# Patient Record
Sex: MALE | Race: WHITE | Employment: FULL TIME | URBAN - METROPOLITAN AREA
[De-identification: names, ages, dates, MRNs, and addresses within clinical notes are randomized per-mention and may not be internally consistent; named-entity substitution may affect disease eponyms.]

---

## 2020-05-08 ENCOUNTER — MEDICAL CORRESPONDENCE (OUTPATIENT)
Dept: HEALTH INFORMATION MANAGEMENT | Facility: CLINIC | Age: 25
End: 2020-05-08

## 2020-05-08 ENCOUNTER — TRANSFERRED RECORDS (OUTPATIENT)
Dept: HEALTH INFORMATION MANAGEMENT | Facility: CLINIC | Age: 25
End: 2020-05-08

## 2020-09-03 ENCOUNTER — HOSPITAL ENCOUNTER (OUTPATIENT)
Dept: MRI IMAGING | Facility: CLINIC | Age: 25
Discharge: HOME | End: 2020-09-03
Admitting: INTERNAL MEDICINE
Payer: COMMERCIAL

## 2020-09-03 DIAGNOSIS — I48.91 AFIB (H): ICD-10-CM

## 2020-09-03 PROCEDURE — 25500064 ZZH RX 255 OP 636

## 2020-09-03 PROCEDURE — A9585 GADOBUTROL INJECTION: HCPCS

## 2020-09-03 PROCEDURE — 75561 CARDIAC MRI FOR MORPH W/DYE: CPT

## 2020-09-03 PROCEDURE — 75561 CARDIAC MRI FOR MORPH W/DYE: CPT | Mod: 26 | Performed by: INTERNAL MEDICINE

## 2020-09-03 RX ORDER — GADOBUTROL 604.72 MG/ML
7.5 INJECTION INTRAVENOUS ONCE
Status: COMPLETED | OUTPATIENT
Start: 2020-09-03 | End: 2020-09-03

## 2020-09-03 RX ADMIN — GADOBUTROL 7.5 ML: 604.72 INJECTION INTRAVENOUS at 15:21

## 2024-10-23 ENCOUNTER — OFFICE VISIT (OUTPATIENT)
Dept: URGENT CARE | Age: 29
End: 2024-10-23
Payer: COMMERCIAL

## 2024-10-23 DIAGNOSIS — S93.402A SPRAIN OF LEFT ANKLE, UNSPECIFIED LIGAMENT, INITIAL ENCOUNTER: ICD-10-CM

## 2024-10-23 DIAGNOSIS — M25.572 ACUTE LEFT ANKLE PAIN: Primary | ICD-10-CM

## 2024-10-23 RX ORDER — METOPROLOL SUCCINATE 100 MG/1
TABLET, EXTENDED RELEASE ORAL
COMMUNITY
Start: 2024-08-19

## 2024-10-23 ASSESSMENT — ENCOUNTER SYMPTOMS: ARTHRALGIAS: 1

## 2024-10-24 NOTE — PROGRESS NOTES
Subjective   Patient ID: Brandon Schmidt is a 29 y.o. male. They present today with a chief complaint of No chief complaint on file..    History of Present Illness  Patient is a 29-year-old male with no reported past medical history presents urgent care this evening with a complaint of left ankle pain.  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  He denies any other injuries or complaints.  He is not on blood thinners.      History provided by:  Patient      Past Medical History  Allergies as of 10/23/2024    (Not on File)       (Not in a hospital admission)         No past medical history on file.    No past surgical history on file.         Review of Systems  Review of Systems   Musculoskeletal:  Positive for arthralgias.                                  Objective    There were no vitals filed for this visit.  No LMP for male patient.    Physical Exam  Vitals and nursing note reviewed.   Constitutional:       General: He is not in acute distress.     Appearance: Normal appearance. He is not ill-appearing, toxic-appearing or diaphoretic.   HENT:      Head: Normocephalic and atraumatic.      Mouth/Throat:      Mouth: Mucous membranes are moist.   Eyes:      Extraocular Movements: Extraocular movements intact.      Conjunctiva/sclera: Conjunctivae normal.      Pupils: Pupils are equal, round, and reactive to light.   Cardiovascular:      Rate and Rhythm: Normal rate and regular rhythm.      Pulses: Normal pulses.      Heart sounds: Normal heart sounds.   Pulmonary:      Effort: Pulmonary effort is normal. No respiratory distress.      Breath sounds: Normal breath sounds. No stridor. No wheezing, rhonchi or rales.   Chest:      Chest wall: No tenderness.   Musculoskeletal:         General: Tenderness and signs of injury present. No swelling or deformity. Normal range of motion.      Cervical back: Normal range of motion and neck supple.      Right lower leg: No  edema.      Left lower leg: No edema.      Left foot: Normal range of motion. No deformity.        Feet:    Feet:      Left foot:      Skin integrity: Skin integrity normal. No erythema or warmth.      Comments: Mild pain with palpation inferior to the lateral malleolus as depicted above.  No appreciable edema.  No ecchymosis.  No obvious deformity or dislocation.  Normal skin color and temperature.  Pedal pulse strong and regular.  Capillary refill normal.  Skin:     General: Skin is warm and dry.      Capillary Refill: Capillary refill takes less than 2 seconds.   Neurological:      General: No focal deficit present.      Mental Status: He is alert and oriented to person, place, and time.   Psychiatric:         Mood and Affect: Mood normal.         Behavior: Behavior normal.         Procedures      Assessment/Plan   Allergies, medications, history, and pertinent labs/EKGs/Imaging reviewed by KELLY Lott.     Medical Decision Making  Patient is well appearing, afebrile, non toxic, not hypoxic, and appropriate for outpatient treatment and management at time of evaluation. Patient presents with left ankle pain as described above.     Differential includes but not limited to: Sprain, strain, fracture, other.  Low suspicion for fracture given exam findings and the fact the patient is able to ambulate without significant discomfort.  Suspect strain/strain.  Patient is unable to return for x-ray tomorrow and states that he does not feel as though it is necessary.  He was provided with an Aircast for comfort and stability.  Also recommended rest, ice, compression and elevation as well as over-the-counter pain medication as needed for symptom relief.  He will follow-up with his PCP as needed.  He was discharged in stable condition.  All questions and concerns addressed.      Plan: Discussed differential with the patient. Patient voices understanding and is agreeable to close follow-up with their PCP in the  next 2-3 days. They understand they should go to the emergency room immediately for any new, worsening or concerning symptoms. Patient understands return precautions and discharge instructions.      Orders and Diagnoses  Diagnoses and all orders for this visit:  Acute left ankle pain      Medical Admin Record      Follow Up Instructions  No follow-ups on file.    Patient disposition: Home    Electronically signed by [facility]  8:21 PM

## 2024-11-09 ENCOUNTER — HOSPITAL ENCOUNTER (EMERGENCY)
Age: 29
Discharge: HOME | End: 2024-11-09
Attending: EMERGENCY MEDICINE

## 2024-11-09 ENCOUNTER — APPOINTMENT (OUTPATIENT)
Dept: GENERAL RADIOLOGY | Age: 29
End: 2024-11-09

## 2024-11-09 VITALS
OXYGEN SATURATION: 96 % | DIASTOLIC BLOOD PRESSURE: 91 MMHG | HEART RATE: 94 BPM | RESPIRATION RATE: 23 BRPM | WEIGHT: 315 LBS | BODY MASS INDEX: 40.43 KG/M2 | TEMPERATURE: 97.8 F | HEIGHT: 74 IN | SYSTOLIC BLOOD PRESSURE: 143 MMHG

## 2024-11-09 DIAGNOSIS — I48.91 ATRIAL FIBRILLATION WITH RAPID VENTRICULAR RESPONSE (HCC): Primary | ICD-10-CM

## 2024-11-09 DIAGNOSIS — Z01.89 ENCOUNTER FOR CARDIOVERSION PROCEDURE: ICD-10-CM

## 2024-11-09 LAB
ALBUMIN SERPL-MCNC: 3.8 G/DL (ref 3.5–4.6)
ALP SERPL-CCNC: 102 U/L (ref 35–104)
ALT SERPL-CCNC: 55 U/L (ref 0–41)
ANION GAP SERPL CALCULATED.3IONS-SCNC: 12 MEQ/L (ref 9–15)
AST SERPL-CCNC: 31 U/L (ref 0–40)
BASOPHILS # BLD: 0.1 K/UL (ref 0–0.2)
BASOPHILS NFR BLD: 0.5 %
BILIRUB SERPL-MCNC: 0.3 MG/DL (ref 0.2–0.7)
BUN SERPL-MCNC: 13 MG/DL (ref 6–20)
CALCIUM SERPL-MCNC: 8.8 MG/DL (ref 8.5–9.9)
CHLORIDE SERPL-SCNC: 101 MEQ/L (ref 95–107)
CO2 SERPL-SCNC: 26 MEQ/L (ref 20–31)
CREAT SERPL-MCNC: 0.73 MG/DL (ref 0.7–1.2)
EKG ATRIAL RATE: 147 BPM
EKG Q-T INTERVAL: 266 MS
EKG QRS DURATION: 88 MS
EKG QTC CALCULATION (BAZETT): 427 MS
EKG R AXIS: 37 DEGREES
EKG T AXIS: 6 DEGREES
EKG VENTRICULAR RATE: 155 BPM
EOSINOPHIL # BLD: 0.2 K/UL (ref 0–0.7)
EOSINOPHIL NFR BLD: 1.7 %
ERYTHROCYTE [DISTWIDTH] IN BLOOD BY AUTOMATED COUNT: 13.3 % (ref 11.5–14.5)
GLOBULIN SER CALC-MCNC: 3.4 G/DL (ref 2.3–3.5)
GLUCOSE SERPL-MCNC: 115 MG/DL (ref 70–99)
HCT VFR BLD AUTO: 48.4 % (ref 42–52)
HGB BLD-MCNC: 15.8 G/DL (ref 14–18)
LYMPHOCYTES # BLD: 3.5 K/UL (ref 1–4.8)
LYMPHOCYTES NFR BLD: 26.8 %
MCH RBC QN AUTO: 28.7 PG (ref 27–31.3)
MCHC RBC AUTO-ENTMCNC: 32.6 % (ref 33–37)
MCV RBC AUTO: 87.8 FL (ref 79–92.2)
MONOCYTES # BLD: 1 K/UL (ref 0.2–0.8)
MONOCYTES NFR BLD: 7.5 %
NEUTROPHILS # BLD: 8.1 K/UL (ref 1.4–6.5)
NEUTS SEG NFR BLD: 63 %
PLATELET # BLD AUTO: 264 K/UL (ref 130–400)
POTASSIUM SERPL-SCNC: 4.1 MEQ/L (ref 3.4–4.9)
PROT SERPL-MCNC: 7.2 G/DL (ref 6.3–8)
RBC # BLD AUTO: 5.51 M/UL (ref 4.7–6.1)
SODIUM SERPL-SCNC: 139 MEQ/L (ref 135–144)
TROPONIN, HIGH SENSITIVITY: 10 NG/L (ref 0–19)
WBC # BLD AUTO: 12.9 K/UL (ref 4.8–10.8)

## 2024-11-09 PROCEDURE — 71045 X-RAY EXAM CHEST 1 VIEW: CPT

## 2024-11-09 PROCEDURE — 80053 COMPREHEN METABOLIC PANEL: CPT

## 2024-11-09 PROCEDURE — 85025 COMPLETE CBC W/AUTO DIFF WBC: CPT

## 2024-11-09 PROCEDURE — 96375 TX/PRO/DX INJ NEW DRUG ADDON: CPT

## 2024-11-09 PROCEDURE — 2500000003 HC RX 250 WO HCPCS

## 2024-11-09 PROCEDURE — 6360000002 HC RX W HCPCS: Performed by: EMERGENCY MEDICINE

## 2024-11-09 PROCEDURE — 96365 THER/PROPH/DIAG IV INF INIT: CPT

## 2024-11-09 PROCEDURE — 2500000003 HC RX 250 WO HCPCS: Performed by: EMERGENCY MEDICINE

## 2024-11-09 PROCEDURE — 6370000000 HC RX 637 (ALT 250 FOR IP): Performed by: EMERGENCY MEDICINE

## 2024-11-09 PROCEDURE — 93005 ELECTROCARDIOGRAM TRACING: CPT | Performed by: EMERGENCY MEDICINE

## 2024-11-09 PROCEDURE — 92960 CARDIOVERSION ELECTRIC EXT: CPT

## 2024-11-09 PROCEDURE — 84484 ASSAY OF TROPONIN QUANT: CPT

## 2024-11-09 PROCEDURE — 99285 EMERGENCY DEPT VISIT HI MDM: CPT

## 2024-11-09 RX ORDER — METOPROLOL TARTRATE 1 MG/ML
5 INJECTION, SOLUTION INTRAVENOUS ONCE
Status: COMPLETED | OUTPATIENT
Start: 2024-11-09 | End: 2024-11-09

## 2024-11-09 RX ORDER — ETOMIDATE 2 MG/ML
20 INJECTION INTRAVENOUS ONCE
Status: COMPLETED | OUTPATIENT
Start: 2024-11-09 | End: 2024-11-09

## 2024-11-09 RX ORDER — MAGNESIUM SULFATE IN WATER 40 MG/ML
2000 INJECTION, SOLUTION INTRAVENOUS ONCE
Status: COMPLETED | OUTPATIENT
Start: 2024-11-09 | End: 2024-11-09

## 2024-11-09 RX ORDER — METOPROLOL SUCCINATE 25 MG/1
25 TABLET, EXTENDED RELEASE ORAL ONCE
Status: COMPLETED | OUTPATIENT
Start: 2024-11-09 | End: 2024-11-09

## 2024-11-09 RX ORDER — DILTIAZEM HYDROCHLORIDE 5 MG/ML
20 INJECTION INTRAVENOUS ONCE
Status: COMPLETED | OUTPATIENT
Start: 2024-11-09 | End: 2024-11-09

## 2024-11-09 RX ORDER — METOPROLOL TARTRATE 1 MG/ML
INJECTION, SOLUTION INTRAVENOUS
Status: COMPLETED
Start: 2024-11-09 | End: 2024-11-09

## 2024-11-09 RX ORDER — METOPROLOL SUCCINATE 25 MG/1
25 TABLET, EXTENDED RELEASE ORAL DAILY
Qty: 30 TABLET | Refills: 0 | Status: SHIPPED | OUTPATIENT
Start: 2024-11-09

## 2024-11-09 RX ORDER — METOPROLOL SUCCINATE 25 MG/1
25 TABLET, EXTENDED RELEASE ORAL DAILY
Status: DISCONTINUED | OUTPATIENT
Start: 2024-11-09 | End: 2024-11-09

## 2024-11-09 RX ADMIN — METOPROLOL TARTRATE 5 MG: 5 INJECTION INTRAVENOUS at 05:00

## 2024-11-09 RX ADMIN — ETOMIDATE 20 MG: 2 INJECTION, SOLUTION INTRAVENOUS at 04:38

## 2024-11-09 RX ADMIN — METOPROLOL TARTRATE 5 MG: 1 INJECTION, SOLUTION INTRAVENOUS at 05:00

## 2024-11-09 RX ADMIN — MAGNESIUM SULFATE HEPTAHYDRATE 2000 MG: 40 INJECTION, SOLUTION INTRAVENOUS at 04:12

## 2024-11-09 RX ADMIN — METOPROLOL SUCCINATE 25 MG: 25 TABLET, EXTENDED RELEASE ORAL at 06:04

## 2024-11-09 RX ADMIN — RIVAROXABAN 20 MG: 20 TABLET, FILM COATED ORAL at 06:04

## 2024-11-09 RX ADMIN — DILTIAZEM HYDROCHLORIDE 20 MG: 5 INJECTION, SOLUTION INTRAVENOUS at 04:07

## 2024-11-09 ASSESSMENT — LIFESTYLE VARIABLES
HOW MANY STANDARD DRINKS CONTAINING ALCOHOL DO YOU HAVE ON A TYPICAL DAY: PATIENT DOES NOT DRINK
HOW OFTEN DO YOU HAVE A DRINK CONTAINING ALCOHOL: NEVER

## 2024-11-09 ASSESSMENT — PAIN SCALES - GENERAL: PAINLEVEL_OUTOF10: 0

## 2024-11-09 ASSESSMENT — PAIN - FUNCTIONAL ASSESSMENT: PAIN_FUNCTIONAL_ASSESSMENT: NONE - DENIES PAIN

## 2024-11-09 NOTE — ED PROVIDER NOTES
[facility] ED  eMERGENCY dEPARTMENT eNCOUnter      Pt Name: Napoleon Owens  MRN: [id number]  Birthdate 1995  Date of evaluation: 11/9/2024  Provider: Jemal Man MD    CHIEF COMPLAINT       Chief Complaint   Patient presents with    Tachycardia         HISTORY OF PRESENT ILLNESS   (Location/Symptom, Timing/Onset,Context/Setting, Quality, Duration, Modifying Factors, Severity)  Note limiting factors.   Napoleon Owens is a 29 y.o. male who presents to the emergency department for evaluation of rapid heartbeat.  Patient has history of atrial fibrillation controlled typically on metoprolol 100 mg daily.  He moved here few months ago from [place].  Last episode of A-fib was in [place] in April 2024.  He required cardioversion.  He is not on blood thinners.  No other medications.  He went to bed feeling fine and then woke up middle the night with these symptoms feeling like his heart was beating irregular and fast.  No chest pain.  No nausea vomiting.  No fever or chills.  He denies recent alcohol use abuse or extra caffeine in the past 24 hours    HPI    NursingNotes were reviewed.    REVIEW OF SYSTEMS    (2-9 systems for level 4, 10 or more for level 5)     Review of Systems   Constitutional:  Negative for chills and diaphoresis.   HENT:  Negative for congestion, ear pain, mouth sores and sore throat.    Eyes:  Negative for photophobia and discharge.   Respiratory:  Negative for cough, chest tightness, shortness of breath and wheezing.    Cardiovascular:  Negative for chest pain and palpitations.   Gastrointestinal:  Negative for abdominal distention, abdominal pain and vomiting.   Endocrine: Negative for cold intolerance.   Genitourinary:  Negative for difficulty urinating.   Musculoskeletal:  Negative for arthralgias.   Skin:  Negative for pallor and rash.   Allergic/Immunologic: Negative for immunocompromised state.   Neurological:  Negative for dizziness and syncope.   Hematological:

## 2024-11-09 NOTE — DISCHARGE INSTRUCTIONS
You are going to take a total of 125 mg of metoprolol daily.  Also going to restart Xarelto.  Follow-up with Dr. Roth.  Return for any recurring symptoms

## 2024-11-09 NOTE — ED NOTES
0448- 8 mL  (16 mg) of etomidate administered per Dr. Man.  0449- 200 J shock administered  0450-300 J shock administered   Additional 2 mL (4 mg) of etomidate administered per Dr. Man.  0451- 300 J shock administered. Pt cardio converted    0500 lopressor administered.

## 2024-11-09 NOTE — ED TRIAGE NOTES
Patient arrived to ER with girlfriend.  Patient was sleeping, woke up and felt heart racing.   Patient has h/o A-fib.   Respirations equal and unlabored, skin pink warm and dry.

## 2024-11-11 LAB
EKG ATRIAL RATE: 147 BPM
EKG Q-T INTERVAL: 266 MS
EKG QRS DURATION: 88 MS
EKG QTC CALCULATION (BAZETT): 427 MS
EKG R AXIS: 37 DEGREES
EKG T AXIS: 6 DEGREES
EKG VENTRICULAR RATE: 155 BPM

## 2024-11-12 LAB
EKG ATRIAL RATE: 91 BPM
EKG P AXIS: 42 DEGREES
EKG P-R INTERVAL: 176 MS
EKG Q-T INTERVAL: 366 MS
EKG QRS DURATION: 96 MS
EKG QTC CALCULATION (BAZETT): 450 MS
EKG R AXIS: 19 DEGREES
EKG T AXIS: 10 DEGREES
EKG VENTRICULAR RATE: 91 BPM

## 2025-01-22 ENCOUNTER — OFFICE VISIT (OUTPATIENT)
Dept: FAMILY MEDICINE CLINIC | Age: 30
End: 2025-01-22

## 2025-01-22 VITALS
WEIGHT: 315 LBS | HEART RATE: 101 BPM | OXYGEN SATURATION: 95 % | SYSTOLIC BLOOD PRESSURE: 128 MMHG | TEMPERATURE: 97.6 F | DIASTOLIC BLOOD PRESSURE: 82 MMHG | BODY MASS INDEX: 40.43 KG/M2 | HEIGHT: 74 IN

## 2025-01-22 DIAGNOSIS — R05.1 ACUTE COUGH: ICD-10-CM

## 2025-01-22 DIAGNOSIS — J02.9 SORE THROAT: Primary | ICD-10-CM

## 2025-01-22 LAB — RSV RAPID ANTIGEN: NORMAL

## 2025-01-22 SDOH — ECONOMIC STABILITY: FOOD INSECURITY: WITHIN THE PAST 12 MONTHS, THE FOOD YOU BOUGHT JUST DIDN'T LAST AND YOU DIDN'T HAVE MONEY TO GET MORE.: NEVER TRUE

## 2025-01-22 SDOH — ECONOMIC STABILITY: FOOD INSECURITY: WITHIN THE PAST 12 MONTHS, YOU WORRIED THAT YOUR FOOD WOULD RUN OUT BEFORE YOU GOT MONEY TO BUY MORE.: NEVER TRUE

## 2025-01-22 ASSESSMENT — ENCOUNTER SYMPTOMS
EYE REDNESS: 0
SHORTNESS OF BREATH: 0
COUGH: 1
COLOR CHANGE: 0
EYE DISCHARGE: 0
SINUS PAIN: 0
WHEEZING: 0
CHEST TIGHTNESS: 0
SORE THROAT: 1

## 2025-01-22 ASSESSMENT — PATIENT HEALTH QUESTIONNAIRE - PHQ9
2. FEELING DOWN, DEPRESSED OR HOPELESS: NOT AT ALL
SUM OF ALL RESPONSES TO PHQ QUESTIONS 1-9: 0
1. LITTLE INTEREST OR PLEASURE IN DOING THINGS: NOT AT ALL
SUM OF ALL RESPONSES TO PHQ9 QUESTIONS 1 & 2: 0
SUM OF ALL RESPONSES TO PHQ QUESTIONS 1-9: 0

## 2025-01-22 NOTE — PROGRESS NOTES
Napoleon Owens (: 1995) is a 29 y.o. male, New patient, who presents today for:    Chief Complaint   Patient presents with    Cough     Pt was around a family member this weekend that had RSV, pt has been under the weather since and wanted to get tested to find out if he has it also          Subjective     HPI:  HPI   Patient presents to the office to be tested for RSV  3 year old nephew with RSV and pt was around him over this past weekend    Pt started with s/s 4 days ago    Early this morning with 100.3 temp, Tylenol brought it down to normal  Pt with cough, scratchy throat  Denies Headache, earaches, wheezing or SOB    Covid negative at home yesterday and today    Review of Systems   Constitutional:  Positive for fever (100.3 earlier this morning, brought down with Tylenol). Negative for chills and unexpected weight change.   HENT:  Positive for postnasal drip and sore throat. Negative for congestion, ear pain and sinus pain.    Eyes:  Negative for discharge, redness and visual disturbance.   Respiratory:  Positive for cough. Negative for chest tightness, shortness of breath and wheezing.    Cardiovascular:  Negative for chest pain, palpitations and leg swelling.   Musculoskeletal:  Negative for arthralgias and myalgias.   Skin:  Negative for color change and rash.   Allergic/Immunologic: Negative for environmental allergies.   Neurological:  Negative for dizziness, light-headedness and headaches.   Hematological:  Negative for adenopathy.   Psychiatric/Behavioral:  Negative for confusion and sleep disturbance. The patient is not nervous/anxious.         Past Medical History:   Diagnosis Date    Atrial fibrillation, controlled ([facility])       Social History     Socioeconomic History    Marital status: Single     Spouse name: Not on file    Number of children: Not on file    Years of education: Not on file    Highest education level: Not on file   Occupational History    Not on file   Tobacco Use

## 2025-04-04 ENCOUNTER — PHARMACY VISIT (OUTPATIENT)
Dept: PHARMACY | Facility: CLINIC | Age: 30
End: 2025-04-04
Payer: COMMERCIAL

## 2025-04-04 ENCOUNTER — HOSPITAL ENCOUNTER (OUTPATIENT)
Facility: HOSPITAL | Age: 30
Setting detail: OBSERVATION
Discharge: HOME | End: 2025-04-04
Attending: STUDENT IN AN ORGANIZED HEALTH CARE EDUCATION/TRAINING PROGRAM | Admitting: INTERNAL MEDICINE
Payer: COMMERCIAL

## 2025-04-04 ENCOUNTER — APPOINTMENT (OUTPATIENT)
Dept: CARDIOLOGY | Facility: HOSPITAL | Age: 30
End: 2025-04-04
Payer: COMMERCIAL

## 2025-04-04 ENCOUNTER — APPOINTMENT (OUTPATIENT)
Dept: RADIOLOGY | Facility: HOSPITAL | Age: 30
End: 2025-04-04
Payer: COMMERCIAL

## 2025-04-04 VITALS
HEART RATE: 88 BPM | WEIGHT: 315 LBS | RESPIRATION RATE: 18 BRPM | SYSTOLIC BLOOD PRESSURE: 135 MMHG | HEIGHT: 74 IN | DIASTOLIC BLOOD PRESSURE: 77 MMHG | BODY MASS INDEX: 40.43 KG/M2 | TEMPERATURE: 98.8 F | OXYGEN SATURATION: 99 %

## 2025-04-04 DIAGNOSIS — I48.91 ATRIAL FIBRILLATION WITH RAPID VENTRICULAR RESPONSE (MULTI): ICD-10-CM

## 2025-04-04 DIAGNOSIS — I48.91 ATRIAL FIBRILLATION, UNSPECIFIED TYPE (MULTI): Primary | ICD-10-CM

## 2025-04-04 LAB
ALBUMIN SERPL BCP-MCNC: 4 G/DL (ref 3.4–5)
ALP SERPL-CCNC: 84 U/L (ref 33–120)
ALT SERPL W P-5'-P-CCNC: 60 U/L (ref 10–52)
ANION GAP SERPL CALC-SCNC: 14 MMOL/L (ref 10–20)
AORTIC VALVE MEAN GRADIENT: 4 MMHG
AORTIC VALVE PEAK VELOCITY: 1.23 M/S
APTT PPP: 30 SECONDS (ref 26–36)
AST SERPL W P-5'-P-CCNC: 28 U/L (ref 9–39)
ATRIAL RATE: 100 BPM
ATRIAL RATE: 138 BPM
AV PEAK GRADIENT: 6 MMHG
AVA (PEAK VEL): 3.61 CM2
AVA (VTI): 3.58 CM2
BASOPHILS # BLD AUTO: 0.07 X10*3/UL (ref 0–0.1)
BASOPHILS NFR BLD AUTO: 0.5 %
BILIRUB SERPL-MCNC: 0.3 MG/DL (ref 0–1.2)
BNP SERPL-MCNC: 10 PG/ML (ref 0–99)
BUN SERPL-MCNC: 14 MG/DL (ref 6–23)
CALCIUM SERPL-MCNC: 9 MG/DL (ref 8.6–10.3)
CARDIAC TROPONIN I PNL SERPL HS: 5 NG/L (ref 0–20)
CARDIAC TROPONIN I PNL SERPL HS: 7 NG/L (ref 0–20)
CHLORIDE SERPL-SCNC: 102 MMOL/L (ref 98–107)
CO2 SERPL-SCNC: 26 MMOL/L (ref 21–32)
CREAT SERPL-MCNC: 0.81 MG/DL (ref 0.5–1.3)
EGFRCR SERPLBLD CKD-EPI 2021: >90 ML/MIN/1.73M*2
EJECTION FRACTION APICAL 4 CHAMBER: 52.3
EJECTION FRACTION: 55 %
EOSINOPHIL # BLD AUTO: 0.27 X10*3/UL (ref 0–0.7)
EOSINOPHIL NFR BLD AUTO: 1.9 %
ERYTHROCYTE [DISTWIDTH] IN BLOOD BY AUTOMATED COUNT: 13.6 % (ref 11.5–14.5)
EST. AVERAGE GLUCOSE BLD GHB EST-MCNC: 131 MG/DL
GLUCOSE SERPL-MCNC: 126 MG/DL (ref 74–99)
HBA1C MFR BLD: 6.2 %
HCT VFR BLD AUTO: 47 % (ref 41–52)
HGB BLD-MCNC: 15.5 G/DL (ref 13.5–17.5)
IMM GRANULOCYTES # BLD AUTO: 0.07 X10*3/UL (ref 0–0.7)
IMM GRANULOCYTES NFR BLD AUTO: 0.5 % (ref 0–0.9)
INR PPP: 1 (ref 0.9–1.1)
LEFT ATRIUM VOLUME AREA LENGTH INDEX BSA: 24.6 ML/M2
LEFT VENTRICLE INTERNAL DIMENSION DIASTOLE: 6.29 CM (ref 3.5–6)
LEFT VENTRICULAR OUTFLOW TRACT DIAMETER: 2.33 CM
LV EJECTION FRACTION BIPLANE: 53 %
LYMPHOCYTES # BLD AUTO: 3.96 X10*3/UL (ref 1.2–4.8)
LYMPHOCYTES NFR BLD AUTO: 28 %
MAGNESIUM SERPL-MCNC: 1.93 MG/DL (ref 1.6–2.4)
MCH RBC QN AUTO: 29.1 PG (ref 26–34)
MCHC RBC AUTO-ENTMCNC: 33 G/DL (ref 32–36)
MCV RBC AUTO: 88 FL (ref 80–100)
MITRAL VALVE E/A RATIO: 1.61
MONOCYTES # BLD AUTO: 1.08 X10*3/UL (ref 0.1–1)
MONOCYTES NFR BLD AUTO: 7.6 %
NEUTROPHILS # BLD AUTO: 8.67 X10*3/UL (ref 1.2–7.7)
NEUTROPHILS NFR BLD AUTO: 61.5 %
NRBC BLD-RTO: 0 /100 WBCS (ref 0–0)
P AXIS: 46 DEGREES
P OFFSET: 172 MS
P ONSET: 128 MS
PHOSPHATE SERPL-MCNC: 3.7 MG/DL (ref 2.5–4.9)
PLATELET # BLD AUTO: 244 X10*3/UL (ref 150–450)
POTASSIUM SERPL-SCNC: 3.6 MMOL/L (ref 3.5–5.3)
PR INTERVAL: 178 MS
PROT SERPL-MCNC: 7.4 G/DL (ref 6.4–8.2)
PROTHROMBIN TIME: 10.6 SECONDS (ref 9.8–12.4)
Q ONSET: 217 MS
Q ONSET: 219 MS
QRS COUNT: 16 BEATS
QRS COUNT: 21 BEATS
QRS DURATION: 96 MS
QRS DURATION: 96 MS
QT INTERVAL: 296 MS
QT INTERVAL: 370 MS
QTC CALCULATION(BAZETT): 433 MS
QTC CALCULATION(BAZETT): 477 MS
QTC FREDERICIA: 382 MS
QTC FREDERICIA: 439 MS
R AXIS: 34 DEGREES
R AXIS: 42 DEGREES
RBC # BLD AUTO: 5.32 X10*6/UL (ref 4.5–5.9)
RIGHT VENTRICLE FREE WALL PEAK S': 15.8 CM/S
RIGHT VENTRICLE PEAK SYSTOLIC PRESSURE: 29.4 MMHG
SODIUM SERPL-SCNC: 138 MMOL/L (ref 136–145)
T AXIS: 14 DEGREES
T AXIS: 6 DEGREES
T OFFSET: 367 MS
T OFFSET: 402 MS
T4 FREE SERPL-MCNC: 0.78 NG/DL (ref 0.61–1.12)
TRICUSPID ANNULAR PLANE SYSTOLIC EXCURSION: 1.8 CM
TSH SERPL-ACNC: 4.54 MIU/L (ref 0.44–3.98)
VENTRICULAR RATE: 100 BPM
VENTRICULAR RATE: 129 BPM
WBC # BLD AUTO: 14.1 X10*3/UL (ref 4.4–11.3)

## 2025-04-04 PROCEDURE — 84484 ASSAY OF TROPONIN QUANT: CPT | Performed by: STUDENT IN AN ORGANIZED HEALTH CARE EDUCATION/TRAINING PROGRAM

## 2025-04-04 PROCEDURE — 96374 THER/PROPH/DIAG INJ IV PUSH: CPT | Mod: 59

## 2025-04-04 PROCEDURE — 2500000004 HC RX 250 GENERAL PHARMACY W/ HCPCS (ALT 636 FOR OP/ED)

## 2025-04-04 PROCEDURE — 84439 ASSAY OF FREE THYROXINE: CPT | Performed by: INTERNAL MEDICINE

## 2025-04-04 PROCEDURE — 93306 TTE W/DOPPLER COMPLETE: CPT | Performed by: INTERNAL MEDICINE

## 2025-04-04 PROCEDURE — 71045 X-RAY EXAM CHEST 1 VIEW: CPT | Performed by: SURGERY

## 2025-04-04 PROCEDURE — RXMED WILLOW AMBULATORY MEDICATION CHARGE

## 2025-04-04 PROCEDURE — G0378 HOSPITAL OBSERVATION PER HR: HCPCS

## 2025-04-04 PROCEDURE — 2500000004 HC RX 250 GENERAL PHARMACY W/ HCPCS (ALT 636 FOR OP/ED): Performed by: STUDENT IN AN ORGANIZED HEALTH CARE EDUCATION/TRAINING PROGRAM

## 2025-04-04 PROCEDURE — C8929 TTE W OR WO FOL WCON,DOPPLER: HCPCS

## 2025-04-04 PROCEDURE — 93005 ELECTROCARDIOGRAM TRACING: CPT

## 2025-04-04 PROCEDURE — 99285 EMERGENCY DEPT VISIT HI MDM: CPT | Mod: 25 | Performed by: STUDENT IN AN ORGANIZED HEALTH CARE EDUCATION/TRAINING PROGRAM

## 2025-04-04 PROCEDURE — 83880 ASSAY OF NATRIURETIC PEPTIDE: CPT | Performed by: STUDENT IN AN ORGANIZED HEALTH CARE EDUCATION/TRAINING PROGRAM

## 2025-04-04 PROCEDURE — 80053 COMPREHEN METABOLIC PANEL: CPT | Performed by: STUDENT IN AN ORGANIZED HEALTH CARE EDUCATION/TRAINING PROGRAM

## 2025-04-04 PROCEDURE — 71045 X-RAY EXAM CHEST 1 VIEW: CPT

## 2025-04-04 PROCEDURE — 2500000005 HC RX 250 GENERAL PHARMACY W/O HCPCS: Performed by: STUDENT IN AN ORGANIZED HEALTH CARE EDUCATION/TRAINING PROGRAM

## 2025-04-04 PROCEDURE — 99152 MOD SED SAME PHYS/QHP 5/>YRS: CPT | Performed by: STUDENT IN AN ORGANIZED HEALTH CARE EDUCATION/TRAINING PROGRAM

## 2025-04-04 PROCEDURE — 99223 1ST HOSP IP/OBS HIGH 75: CPT | Performed by: INTERNAL MEDICINE

## 2025-04-04 PROCEDURE — 2500000004 HC RX 250 GENERAL PHARMACY W/ HCPCS (ALT 636 FOR OP/ED): Performed by: INTERNAL MEDICINE

## 2025-04-04 PROCEDURE — 83735 ASSAY OF MAGNESIUM: CPT | Performed by: STUDENT IN AN ORGANIZED HEALTH CARE EDUCATION/TRAINING PROGRAM

## 2025-04-04 PROCEDURE — 84100 ASSAY OF PHOSPHORUS: CPT | Performed by: STUDENT IN AN ORGANIZED HEALTH CARE EDUCATION/TRAINING PROGRAM

## 2025-04-04 PROCEDURE — 96375 TX/PRO/DX INJ NEW DRUG ADDON: CPT | Mod: 59

## 2025-04-04 PROCEDURE — 85025 COMPLETE CBC W/AUTO DIFF WBC: CPT | Performed by: STUDENT IN AN ORGANIZED HEALTH CARE EDUCATION/TRAINING PROGRAM

## 2025-04-04 PROCEDURE — 96372 THER/PROPH/DIAG INJ SC/IM: CPT | Performed by: INTERNAL MEDICINE

## 2025-04-04 PROCEDURE — 36415 COLL VENOUS BLD VENIPUNCTURE: CPT | Performed by: STUDENT IN AN ORGANIZED HEALTH CARE EDUCATION/TRAINING PROGRAM

## 2025-04-04 PROCEDURE — 85730 THROMBOPLASTIN TIME PARTIAL: CPT | Performed by: STUDENT IN AN ORGANIZED HEALTH CARE EDUCATION/TRAINING PROGRAM

## 2025-04-04 PROCEDURE — 85610 PROTHROMBIN TIME: CPT | Performed by: STUDENT IN AN ORGANIZED HEALTH CARE EDUCATION/TRAINING PROGRAM

## 2025-04-04 PROCEDURE — 99236 HOSP IP/OBS SAME DATE HI 85: CPT | Performed by: INTERNAL MEDICINE

## 2025-04-04 PROCEDURE — 83036 HEMOGLOBIN GLYCOSYLATED A1C: CPT | Mod: ELYLAB | Performed by: INTERNAL MEDICINE

## 2025-04-04 PROCEDURE — 2500000001 HC RX 250 WO HCPCS SELF ADMINISTERED DRUGS (ALT 637 FOR MEDICARE OP): Performed by: INTERNAL MEDICINE

## 2025-04-04 PROCEDURE — 2500000002 HC RX 250 W HCPCS SELF ADMINISTERED DRUGS (ALT 637 FOR MEDICARE OP, ALT 636 FOR OP/ED): Performed by: INTERNAL MEDICINE

## 2025-04-04 PROCEDURE — 84443 ASSAY THYROID STIM HORMONE: CPT | Performed by: INTERNAL MEDICINE

## 2025-04-04 RX ORDER — POLYETHYLENE GLYCOL 3350 17 G/17G
17 POWDER, FOR SOLUTION ORAL DAILY PRN
Status: DISCONTINUED | OUTPATIENT
Start: 2025-04-04 | End: 2025-04-04 | Stop reason: HOSPADM

## 2025-04-04 RX ORDER — METOPROLOL TARTRATE 1 MG/ML
5 INJECTION, SOLUTION INTRAVENOUS ONCE
Status: COMPLETED | OUTPATIENT
Start: 2025-04-04 | End: 2025-04-04

## 2025-04-04 RX ORDER — KETAMINE HYDROCHLORIDE 10 MG/ML
INJECTION, SOLUTION INTRAMUSCULAR; INTRAVENOUS CODE/TRAUMA/SEDATION MEDICATION
Status: COMPLETED | OUTPATIENT
Start: 2025-04-04 | End: 2025-04-04

## 2025-04-04 RX ORDER — METOPROLOL SUCCINATE 50 MG/1
100 TABLET, EXTENDED RELEASE ORAL DAILY
Status: DISCONTINUED | OUTPATIENT
Start: 2025-04-04 | End: 2025-04-04 | Stop reason: HOSPADM

## 2025-04-04 RX ORDER — METOPROLOL TARTRATE 1 MG/ML
INJECTION, SOLUTION INTRAVENOUS
Status: COMPLETED
Start: 2025-04-04 | End: 2025-04-04

## 2025-04-04 RX ORDER — MAGNESIUM SULFATE HEPTAHYDRATE 40 MG/ML
2 INJECTION, SOLUTION INTRAVENOUS ONCE
Status: COMPLETED | OUTPATIENT
Start: 2025-04-04 | End: 2025-04-04

## 2025-04-04 RX ORDER — ENOXAPARIN SODIUM 100 MG/ML
40 INJECTION SUBCUTANEOUS EVERY 12 HOURS SCHEDULED
Status: DISCONTINUED | OUTPATIENT
Start: 2025-04-04 | End: 2025-04-04

## 2025-04-04 RX ORDER — METOPROLOL SUCCINATE 50 MG/1
100 TABLET, EXTENDED RELEASE ORAL DAILY
Status: DISCONTINUED | OUTPATIENT
Start: 2025-04-04 | End: 2025-04-04

## 2025-04-04 RX ORDER — PROPOFOL 10 MG/ML
INJECTION, EMULSION INTRAVENOUS CODE/TRAUMA/SEDATION MEDICATION
Status: COMPLETED | OUTPATIENT
Start: 2025-04-04 | End: 2025-04-04

## 2025-04-04 RX ORDER — PROPOFOL 10 MG/ML
0.5 INJECTION, EMULSION INTRAVENOUS ONCE
Status: DISCONTINUED | OUTPATIENT
Start: 2025-04-04 | End: 2025-04-04

## 2025-04-04 RX ORDER — POTASSIUM CHLORIDE 20 MEQ/1
40 TABLET, EXTENDED RELEASE ORAL ONCE
Status: COMPLETED | OUTPATIENT
Start: 2025-04-04 | End: 2025-04-04

## 2025-04-04 RX ORDER — ONDANSETRON HYDROCHLORIDE 2 MG/ML
4 INJECTION, SOLUTION INTRAVENOUS EVERY 6 HOURS PRN
Status: DISCONTINUED | OUTPATIENT
Start: 2025-04-04 | End: 2025-04-04 | Stop reason: HOSPADM

## 2025-04-04 RX ORDER — ENOXAPARIN SODIUM 150 MG/ML
1 INJECTION SUBCUTANEOUS EVERY 12 HOURS
Status: DISCONTINUED | OUTPATIENT
Start: 2025-04-04 | End: 2025-04-04

## 2025-04-04 RX ORDER — ACETAMINOPHEN 325 MG/1
650 TABLET ORAL EVERY 4 HOURS PRN
Status: DISCONTINUED | OUTPATIENT
Start: 2025-04-04 | End: 2025-04-04 | Stop reason: HOSPADM

## 2025-04-04 RX ADMIN — Medication 2 L/MIN: at 05:31

## 2025-04-04 RX ADMIN — KETAMINE HYDROCHLORIDE 80 MG: 10 INJECTION INTRAMUSCULAR; INTRAVENOUS at 05:13

## 2025-04-04 RX ADMIN — MAGNESIUM SULFATE HEPTAHYDRATE 2 G: 40 INJECTION, SOLUTION INTRAVENOUS at 05:01

## 2025-04-04 RX ADMIN — METOPROLOL TARTRATE 5 MG: 1 INJECTION, SOLUTION INTRAVENOUS at 04:12

## 2025-04-04 RX ADMIN — PERFLUTREN 2 ML OF DILUTION: 6.52 INJECTION, SUSPENSION INTRAVENOUS at 13:45

## 2025-04-04 RX ADMIN — METOPROLOL SUCCINATE 100 MG: 50 TABLET, EXTENDED RELEASE ORAL at 08:40

## 2025-04-04 RX ADMIN — METOPROLOL TARTRATE 5 MG: 1 INJECTION, SOLUTION INTRAVENOUS at 04:18

## 2025-04-04 RX ADMIN — ENOXAPARIN SODIUM 150 MG: 150 INJECTION SUBCUTANEOUS at 12:11

## 2025-04-04 RX ADMIN — PROPOFOL 80 MG: 10 INJECTION, EMULSION INTRAVENOUS at 05:12

## 2025-04-04 RX ADMIN — PROPOFOL 20 MG: 10 INJECTION, EMULSION INTRAVENOUS at 05:19

## 2025-04-04 RX ADMIN — METOPROLOL TARTRATE 5 MG: 5 INJECTION INTRAVENOUS at 04:18

## 2025-04-04 RX ADMIN — PROPOFOL 20 MG: 10 INJECTION, EMULSION INTRAVENOUS at 05:14

## 2025-04-04 RX ADMIN — METOPROLOL TARTRATE 5 MG: 5 INJECTION INTRAVENOUS at 04:12

## 2025-04-04 RX ADMIN — POTASSIUM CHLORIDE 40 MEQ: 1500 TABLET, EXTENDED RELEASE ORAL at 08:40

## 2025-04-04 ASSESSMENT — PAIN SCALES - GENERAL
PAINLEVEL_OUTOF10: 0 - NO PAIN
PAINLEVEL_OUTOF10: 0 - NO PAIN

## 2025-04-04 ASSESSMENT — LIFESTYLE VARIABLES
HAVE YOU EVER FELT YOU SHOULD CUT DOWN ON YOUR DRINKING: NO
EVER HAD A DRINK FIRST THING IN THE MORNING TO STEADY YOUR NERVES TO GET RID OF A HANGOVER: NO
EVER FELT BAD OR GUILTY ABOUT YOUR DRINKING: NO
TOTAL SCORE: 0
HAVE PEOPLE ANNOYED YOU BY CRITICIZING YOUR DRINKING: NO

## 2025-04-04 ASSESSMENT — PAIN - FUNCTIONAL ASSESSMENT: PAIN_FUNCTIONAL_ASSESSMENT: 0-10

## 2025-04-04 NOTE — DISCHARGE SUMMARY
DISCHARGE DIAGNOSIS     Afib with RVR    HOSPITAL COURSE AND DETAILS     30M with PMH of pAfib, SHAILA noncompliant with CPAP who is presenting with palpitations. Felt like prior episodes of Afib with RVR. He has been compliant with his pta toprol. He had recently moved to the area and has yet to establish care with pcp or cardiology. He has had extensive scott for his afib in [place], including stress test, echos, cMRI. He has also had ablation in the past. He says his last TTE was last year, ahd showed improved LVEF. Previously had been depressed, thought to be tachy induced CM.      In the ED, he was in Afib with RVR. Did not resolve with IV metoprolol. Had bedside cardioversion done by ED physician. Converted to NSR. Currently remains in NSR, asx.     Afib with RVR  -EP saw patient here, will arrange for outpt fu   -rpt TTE done here showed normal lvef, mild lvh, otherwise unremarkable  -cont PTA Toprol 100  -new start eliquis after dccv for at least 4w     Okay for dc to home.   Has upcoming establish care with PCP Dr. Young on Monday as well.     Total time spent on dc services 31 minutes.      DISCHARGE PHYSICAL EXAM     Last Recorded Vitals:  Vitals:    04/04/25 0815 04/04/25 0830 04/04/25 0845 04/04/25 0946   BP: 165/88 146/90 (!) 161/91 135/77   BP Location:   Right arm Right arm   Patient Position:   Lying Sitting   Pulse: 84 86 84 88   Resp: 16  16 18   Temp: 36.7 °C (98.1 °F)  36.7 °C (98.1 °F) 37.1 °C (98.8 °F)   TempSrc: Temporal  Temporal Temporal   SpO2: 94% 96% 98% 99%   Weight:       Height:           Physical Exam:  GEN: healthy appearing, appears stated age, NAD  CV: RRR, no m/r/g, no LE edema  LUNGS: CTAB, no w/r/c  ABD: soft, NT, obese  SKIN: no rashes  MSK; no gross deformities, normal joints  NEURO: A+Ox3, no FND  PSYCH: appropriate mood, affect      PERTINENT LABS AND IMAGING     Results for orders placed or performed during the hospital encounter of 04/04/25 (from the past 96 hours)   CBC and  Auto Differential   Result Value Ref Range    WBC 14.1 (H) 4.4 - 11.3 x10*3/uL    nRBC 0.0 0.0 - 0.0 /100 WBCs    RBC 5.32 4.50 - 5.90 x10*6/uL    Hemoglobin 15.5 13.5 - 17.5 g/dL    Hematocrit 47.0 41.0 - 52.0 %    MCV 88 80 - 100 fL    MCH 29.1 26.0 - 34.0 pg    MCHC 33.0 32.0 - 36.0 g/dL    RDW 13.6 11.5 - 14.5 %    Platelets 244 150 - 450 x10*3/uL    Neutrophils % 61.5 40.0 - 80.0 %    Immature Granulocytes %, Automated 0.5 0.0 - 0.9 %    Lymphocytes % 28.0 13.0 - 44.0 %    Monocytes % 7.6 2.0 - 10.0 %    Eosinophils % 1.9 0.0 - 6.0 %    Basophils % 0.5 0.0 - 2.0 %    Neutrophils Absolute 8.67 (H) 1.20 - 7.70 x10*3/uL    Immature Granulocytes Absolute, Automated 0.07 0.00 - 0.70 x10*3/uL    Lymphocytes Absolute 3.96 1.20 - 4.80 x10*3/uL    Monocytes Absolute 1.08 (H) 0.10 - 1.00 x10*3/uL    Eosinophils Absolute 0.27 0.00 - 0.70 x10*3/uL    Basophils Absolute 0.07 0.00 - 0.10 x10*3/uL   Comprehensive metabolic panel   Result Value Ref Range    Glucose 126 (H) 74 - 99 mg/dL    Sodium 138 136 - 145 mmol/L    Potassium 3.6 3.5 - 5.3 mmol/L    Chloride 102 98 - 107 mmol/L    Bicarbonate 26 21 - 32 mmol/L    Anion Gap 14 10 - 20 mmol/L    Urea Nitrogen 14 6 - 23 mg/dL    Creatinine 0.81 0.50 - 1.30 mg/dL    eGFR >90 >60 mL/min/1.73m*2    Calcium 9.0 8.6 - 10.3 mg/dL    Albumin 4.0 3.4 - 5.0 g/dL    Alkaline Phosphatase 84 33 - 120 U/L    Total Protein 7.4 6.4 - 8.2 g/dL    AST 28 9 - 39 U/L    Bilirubin, Total 0.3 0.0 - 1.2 mg/dL    ALT 60 (H) 10 - 52 U/L   Magnesium   Result Value Ref Range    Magnesium 1.93 1.60 - 2.40 mg/dL   Phosphorus   Result Value Ref Range    Phosphorus 3.7 2.5 - 4.9 mg/dL   Protime-INR   Result Value Ref Range    Protime 10.6 9.8 - 12.4 seconds    INR 1.0 0.9 - 1.1   aPTT   Result Value Ref Range    aPTT 30 26 - 36 seconds   B-Type Natriuretic Peptide   Result Value Ref Range    BNP 10 0 - 99 pg/mL   Troponin I, High Sensitivity, Initial   Result Value Ref Range    Troponin I, High  Sensitivity 5 0 - 20 ng/L   TSH with reflex to Free T4 if abnormal   Result Value Ref Range    Thyroid Stimulating Hormone 4.54 (H) 0.44 - 3.98 mIU/L   Hemoglobin A1c   Result Value Ref Range    Hemoglobin A1C 6.2 (H) See comment %    Estimated Average Glucose 131 Not Established mg/dL   Thyroxine, Free   Result Value Ref Range    Thyroxine, Free 0.78 0.61 - 1.12 ng/dL   ECG 12 lead   Result Value Ref Range    Ventricular Rate 129 BPM    Atrial Rate 138 BPM    QRS Duration 96 ms    QT Interval 296 ms    QTC Calculation(Bazett) 433 ms    R Axis 42 degrees    T Axis 14 degrees    QRS Count 21 beats    Q Onset 219 ms    T Offset 367 ms    QTC Fredericia 382 ms   Troponin, High Sensitivity, 1 Hour   Result Value Ref Range    Troponin I, High Sensitivity 7 0 - 20 ng/L   ECG 12 lead   Result Value Ref Range    Ventricular Rate 100 BPM    Atrial Rate 100 BPM    ME Interval 178 ms    QRS Duration 96 ms    QT Interval 370 ms    QTC Calculation(Bazett) 477 ms    P Axis 46 degrees    R Axis 34 degrees    T Axis 6 degrees    QRS Count 16 beats    Q Onset 217 ms    P Onset 128 ms    P Offset 172 ms    T Offset 402 ms    QTC Fredericia 439 ms   Transthoracic Echo (TTE) Complete   Result Value Ref Range    AV mn grad 4 mmHg    AV pk derek 1.23 m/s    LV Biplane EF 53 %    LVOT diam 2.33 cm    MV E/A ratio 1.61     Tricuspid annular plane systolic excursion 1.8 cm    LA vol index A/L 24.6 ml/m2    LV EF 55 %    RV free wall pk S' 15.80 cm/s    RVSP 29.4 mmHg    LVIDd 6.29 cm    Aortic Valve Area by Continuity of Peak Velocity 3.61 cm2    AV pk grad 6 mmHg    Aortic Valve Area by Continuity of VTI 3.58 cm2    LV A4C EF 52.3         Transthoracic Echo (TTE) Complete   Final Result      XR chest 1 view   Final Result   1.  No evidence of acute cardiopulmonary process.                  MACRO:   None        Signed by: Vick Berg 4/4/2025 5:01 AM   Dictation workstation:   [id number]          Transthoracic Echo (TTE) Complete    Result  Date: 4/4/2025          [place] [address]  Tel [phone] Fax [phone] TRANSTHORACIC ECHOCARDIOGRAM REPORT Patient Name:       LARRY PAYNERAJANICYRIL     Reading Physician:    [id number] Axel Guerra MD, [facility] Study Date:         4/4/2025            Ordering Provider:    [id number] BRITT                                                               ADIEL MRN/PID:            [id number]            Fellow: Accession#:         [id number]        Nurse:                Kvng Nava RN Date of Birth/Age:  1995 / 30      Sonographer:          Neva manning                                     RDCHEYENNE Gender Assigned at                     Additional Staff: Birth: Height:             187.96 cm           Admit Date:           4/4/2025 Weight:             158.76 kg           Admission Status:     Inpatient -                                                               Routine BSA / BMI:          2.76 m2 / 44.94     Department Location:  [place] [address]                                     Echo Lab Blood Pressure: 131 /65 mmHg Study Type:    TRANSTHORACIC ECHO (TTE) COMPLETE Diagnosis/ICD: Unspecified atrial fibrillation-I48.91 Indication:    AFIB CPT Codes:     Echo Complete w Full [id number] Patient History: Pertinent History: A-Fib, Ablation, Multiple cardioversions and CHF. Study Detail: The following Echo studies were performed: 2D, M-Mode, Doppler and               color flow. Definity used as a contrast agent for endocardial               border definition. Total contrast used for this procedure was 2 mL               via IV push. The patient was awake.  PHYSICIAN INTERPRETATION: Left Ventricle: The left ventricular systolic function is normal, with a visually estimated ejection fraction of 55%. There is mild eccentric left ventricular hypertrophy. There are no regional  wall motion abnormalities. The left ventricular cavity size is normal. There is mild increased septal and mildly increased posterior left ventricular wall thickness. Spectral Doppler shows a normal pattern of left ventricular diastolic filling. Mild concentric LVH. Left Atrium: The left atrial size is moderately dilated. Right Ventricle: The right ventricle is normal in size. There is normal right ventricular global systolic function. Right Atrium: The right atrial size is normal. Aortic Valve: The aortic valve is trileaflet. The aortic valve dimensionless index is 0.84. There is no evidence of aortic valve regurgitation. The peak instantaneous gradient of the aortic valve is 6 mmHg. The mean gradient of the aortic valve is 4 mmHg. Mitral Valve: The mitral valve is normal in structure. There is no evidence of mitral valve regurgitation. Tricuspid Valve: The tricuspid valve is structurally normal. There is mild tricuspid regurgitation. The Doppler estimated RVSP is within normal limits at 29.4 mmHg. Pulmonic Valve: The pulmonic valve is structurally normal. There is no indication of pulmonic valve regurgitation. Pericardium: No pericardial effusion noted. Aorta: The aortic root is normal.  CONCLUSIONS:  1. The left ventricular systolic function is normal, with a visually estimated ejection fraction of 55%.  2. Mild concentric LVH.  3. There is normal right ventricular global systolic function.  4. The left atrial size is moderately dilated.  5. Right ventricular systolic pressure is within normal limits.  6. Normal valve structure and function. QUANTITATIVE DATA SUMMARY:  2D MEASUREMENTS:             Normal Ranges: Ao Root d:       3.58 cm     (2.0-3.7cm) LAs:             5.13 cm     (2.7-4.0cm) IVSd:            1.21 cm     (0.6-1.1cm) LVPWd:           1.20 cm     (0.6-1.1cm) LVIDd:           6.29 cm     (3.9-5.9cm) LVIDs:           4.58 cm LV Mass Index:   123.9 g/m2 LVEDV Index:     77.18 ml/m2 LV % FS           27.2 %  LEFT ATRIUM:                  Normal Ranges: LA Vol A4C:        70.4 ml    (22+/-6mL/m2) LA Vol A2C:        60.2 ml LA Vol BP:         67.7 ml LA Vol Index A4C:  25.5ml/m2 LA Vol Index A2C:  21.8 ml/m2 LA Vol Index BP:   24.6 ml/m2 LA Area A4C:       23.4 cm2 LA Area A2C:       20.8 cm2 LA Major Axis A4C: 6.6 cm LA Major Axis A2C: 6.1 cm LA Volume Index:   23.3 ml/m2  RIGHT ATRIUM:                 Normal Ranges: RA Vol A4C:        46.4 ml    (8.3-19.5ml) RA Vol Index A4C:  16.8 ml/m2 RA Area A4C:       18.7 cm2 RA Major Axis A4C: 6.4 cm  AORTA MEASUREMENTS:         Normal Ranges: Asc Ao, d:          3.29 cm (2.1-3.4cm)  LV SYSTOLIC FUNCTION:                      Normal Ranges: EF-A4C View:    52 % (>=55%) EF-A2C View:    55 % EF-Biplane:     53 % EF-Visual:      55 % LV EF Reported: 55 %  LV DIASTOLIC FUNCTION:           Normal Ranges: MV Peak E:             0.81 m/s  (0.7-1.2 m/s) MV Peak A:             0.51 m/s  (0.42-0.7 m/s) E/A Ratio:             1.61      (1.0-2.2) MV e'                  0.120 m/s (>8.0) MV lateral e'          0.14 m/s MV medial e'           0.10 m/s E/e' Ratio:            6.78      (<8.0)  MITRAL VALVE:          Normal Ranges: MV DT:        168 msec (150-240msec)  AORTIC VALVE:                     Normal Ranges: AoV Vmax:                1.23 m/s (<=1.7m/s) AoV Peak P.1 mmHg (<20mmHg) AoV Mean P.0 mmHg (1.7-11.5mmHg) LVOT Max Ronni:            1.04 m/s (<=1.1m/s) AoV VTI:                 25.60 cm (18-25cm) LVOT VTI:                21.50 cm LVOT Diameter:           2.33 cm  (1.8-2.4cm) AoV Area, VTI:           3.58 cm2 (2.5-5.5cm2) AoV Area,Vmax:           3.61 cm2 (2.5-4.5cm2) AoV Dimensionless Index: 0.84  RIGHT VENTRICLE: RV Basal 4.78 cm RV Mid   3.75 cm RV Major 9.7 cm TAPSE:   17.7 mm RV s'    0.16 m/s  TRICUSPID VALVE/RVSP:          Normal Ranges: Peak TR Velocity:     2.57 m/s RV Syst Pressure:     29 mmHg  (< 30mmHg)  PULMONIC VALVE:           Normal Ranges: PV Accel Time:  114 msec (>120ms) PV Max Ronni:     1.2 m/s  (0.6-0.9m/s) PV Max P.3 mmHg  [id number] Axel Guerra MD, [facility] Electronically signed on 2025 at 2:24:01 PM  ** Final **      DISCHARGE MEDICATIONS        Your medication list        START taking these medications        Instructions Last Dose Given Next Dose Due   Eliquis 5 mg tablet  Generic drug: apixaban      Take 1 tablet (5 mg) by mouth 2 times a day.              CONTINUE taking these medications        Instructions Last Dose Given Next Dose Due   metoprolol succinate  mg 24 hr tablet  Commonly known as: Toprol-XL                     Where to Get Your Medications        These medications were sent to [facility]  [address] [address]      Hours: 9 AM to 5:30 PM Mon-Fri, 9 AM to 1 PM Saturday Phone: [phone]   Eliquis 5 mg tablet         OUTPATIENT FOLLOW-UP     Future Appointments   Date Time Provider Department Center   2025  3:45 PM [facility]

## 2025-04-04 NOTE — H&P
Medical Group History and Physical    ASSESSMENT & PLAN:     Afib with RVR  -known dx since 2019, extensive scott done in [place]. Has had ablation as well.   -s/p DCCV in ED with successful conversion to nsr. Pt now asx, feels at baseline  -EP consulted  -will rpt TTE here  -cont PTA Toprol 100  -will need at least 4w AC after DCCV, placed on Lovenox this AM, can switch to DOAC this evening if okay with EP  -possible dc later today even if okay with EP    Has upcoming establish care with Dr. Young on Monday.     Brayan Diane MD    HISTORY OF PRESENT ILLNESS:   Chief Complaint: palpitations    History Of Present Illness:    30M with PMH of pAfib, SHAILA noncompliant with CPAP who is presenting with palpitations. Felt like prior episodes of Afib with RVR. He has been compliant with his pta toprol. He had recently moved to the area and has yet to establish care with pcp or cardiology. He has had extensive scott for his afib in [place], including stress test, echos, cMRI. He has also had ablation in the past. He says his last TTE was last year, ahd showed improved LVEF. Previously had been depressed, thought to be tachy induced CM.     In the ED, he was in Afib with RVR. Did not resolve with IV metoprolol. Had bedside DCCV done by ED physician. Converted to NSR. Currently remains in NSR, asx.     Review of systems: 10 point review of systems is otherwise negative except as mentioned above.    PAST HISTORIES:     Past Medical History:  He has no past medical history on file.    Past Surgical History:  He has no past surgical history on file.      Social History:  He has no history on file for tobacco use, alcohol use, and drug use.    Family History:  No family history on file.     Allergies:  Patient has no known allergies.    OBJECTIVE:     Last Recorded Vitals:  Vitals:    04/04/25 0815 04/04/25 0830 04/04/25 0845 04/04/25 0946   BP: 165/88 146/90 (!) 161/91 135/77   BP Location:   Right arm Right arm   Patient Position:   Lying  Sitting   Pulse: 84 86 84 88   Resp: 16  16 18   Temp: 36.7 °C (98.1 °F)  36.7 °C (98.1 °F) 37.1 °C (98.8 °F)   TempSrc: Temporal  Temporal Temporal   SpO2: 94% 96% 98% 99%   Weight:       Height:           Last I/O:  I/O last 3 completed shifts:  In: 50 (0.3 mL/kg) [I.V.:50 (0.3 mL/kg)]  Out: - (0 mL/kg)   Weight: 158.8 kg     Physical Exam:  GEN: healthy appearing, appears stated age, NAD  CV: RRR, no m/r/g, no LE edema  LUNGS: CTAB, no w/r/c  ABD: soft, NT, obese  SKIN: no rashes  MSK; no gross deformities, normal joints  NEURO: A+Ox3, no FND  PSYCH: appropriate mood, affect    Scheduled Medications  enoxaparin, 1 mg/kg, subcutaneous, q12h  metoprolol succinate XL, 100 mg, oral, Daily        PRN Medications  PRN medications: acetaminophen, ondansetron, polyethylene glycol    Continuous Medications       Outpatient Medications:  Prior to Admission medications    Medication Sig Start Date End Date Taking? Authorizing Provider   metoprolol succinate XL (Toprol-XL) 100 mg 24 hr tablet Take 1 tablet (100 mg) by mouth once daily. 8/19/24  Yes Historical Provider, MD       LABS AND IMAGING:     Labs:  Results for orders placed or performed during the hospital encounter of 04/04/25 (from the past 24 hours)   CBC and Auto Differential   Result Value Ref Range    WBC 14.1 (H) 4.4 - 11.3 x10*3/uL    nRBC 0.0 0.0 - 0.0 /100 WBCs    RBC 5.32 4.50 - 5.90 x10*6/uL    Hemoglobin 15.5 13.5 - 17.5 g/dL    Hematocrit 47.0 41.0 - 52.0 %    MCV 88 80 - 100 fL    MCH 29.1 26.0 - 34.0 pg    MCHC 33.0 32.0 - 36.0 g/dL    RDW 13.6 11.5 - 14.5 %    Platelets 244 150 - 450 x10*3/uL    Neutrophils % 61.5 40.0 - 80.0 %    Immature Granulocytes %, Automated 0.5 0.0 - 0.9 %    Lymphocytes % 28.0 13.0 - 44.0 %    Monocytes % 7.6 2.0 - 10.0 %    Eosinophils % 1.9 0.0 - 6.0 %    Basophils % 0.5 0.0 - 2.0 %    Neutrophils Absolute 8.67 (H) 1.20 - 7.70 x10*3/uL    Immature Granulocytes Absolute, Automated 0.07 0.00 - 0.70 x10*3/uL    Lymphocytes  Absolute 3.96 1.20 - 4.80 x10*3/uL    Monocytes Absolute 1.08 (H) 0.10 - 1.00 x10*3/uL    Eosinophils Absolute 0.27 0.00 - 0.70 x10*3/uL    Basophils Absolute 0.07 0.00 - 0.10 x10*3/uL   Comprehensive metabolic panel   Result Value Ref Range    Glucose 126 (H) 74 - 99 mg/dL    Sodium 138 136 - 145 mmol/L    Potassium 3.6 3.5 - 5.3 mmol/L    Chloride 102 98 - 107 mmol/L    Bicarbonate 26 21 - 32 mmol/L    Anion Gap 14 10 - 20 mmol/L    Urea Nitrogen 14 6 - 23 mg/dL    Creatinine 0.81 0.50 - 1.30 mg/dL    eGFR >90 >60 mL/min/1.73m*2    Calcium 9.0 8.6 - 10.3 mg/dL    Albumin 4.0 3.4 - 5.0 g/dL    Alkaline Phosphatase 84 33 - 120 U/L    Total Protein 7.4 6.4 - 8.2 g/dL    AST 28 9 - 39 U/L    Bilirubin, Total 0.3 0.0 - 1.2 mg/dL    ALT 60 (H) 10 - 52 U/L   Magnesium   Result Value Ref Range    Magnesium 1.93 1.60 - 2.40 mg/dL   Phosphorus   Result Value Ref Range    Phosphorus 3.7 2.5 - 4.9 mg/dL   Protime-INR   Result Value Ref Range    Protime 10.6 9.8 - 12.4 seconds    INR 1.0 0.9 - 1.1   aPTT   Result Value Ref Range    aPTT 30 26 - 36 seconds   B-Type Natriuretic Peptide   Result Value Ref Range    BNP 10 0 - 99 pg/mL   Troponin I, High Sensitivity, Initial   Result Value Ref Range    Troponin I, High Sensitivity 5 0 - 20 ng/L   TSH with reflex to Free T4 if abnormal   Result Value Ref Range    Thyroid Stimulating Hormone 4.54 (H) 0.44 - 3.98 mIU/L   Thyroxine, Free   Result Value Ref Range    Thyroxine, Free 0.78 0.61 - 1.12 ng/dL   ECG 12 lead   Result Value Ref Range    Ventricular Rate 129 BPM    Atrial Rate 138 BPM    QRS Duration 96 ms    QT Interval 296 ms    QTC Calculation(Bazett) 433 ms    R Axis 42 degrees    T Axis 14 degrees    QRS Count 21 beats    Q Onset 219 ms    T Offset 367 ms    QTC Fredericia 382 ms   Troponin, High Sensitivity, 1 Hour   Result Value Ref Range    Troponin I, High Sensitivity 7 0 - 20 ng/L   ECG 12 lead   Result Value Ref Range    Ventricular Rate 100 BPM    Atrial Rate 100 BPM     UT Interval 178 ms    QRS Duration 96 ms    QT Interval 370 ms    QTC Calculation(Bazett) 477 ms    P Axis 46 degrees    R Axis 34 degrees    T Axis 6 degrees    QRS Count 16 beats    Q Onset 217 ms    P Onset 128 ms    P Offset 172 ms    T Offset 402 ms    QTC Fredericia 439 ms        Imaging:  ECG 12 lead  Normal sinus rhythm  Normal ECG  When compared with ECG of 04-APR-2025 04:02, (unconfirmed)  Sinus rhythm has replaced Atrial fibrillation  See ED provider note for full interpretation and clinical correlation  Confirmed by Constance Ceballos ([id number]) on 4/4/2025 10:47:15 AM  ECG 12 lead  Atrial fibrillation with rapid ventricular response with premature ventricular or aberrantly conducted complexes  Abnormal ECG  No previous ECGs available  Confirmed by Chacho Harper ([id number]) on 4/4/2025 8:30:27 AM  XR chest 1 view  Narrative: Interpreted By:  Vick Berg,   STUDY:  XR CHEST 1 VIEW;  4/4/2025 4:25 am      INDICATION:  Signs/Symptoms:Palpitations.          COMPARISON:  None.      ACCESSION NUMBER(S):  [id number]      ORDERING CLINICIAN:  PRINCESS YOUNG      FINDINGS:  Semi-erect AP view of the chest.      Leads overlie the chest, partially obscuring the field-of-view.  Apical lordosis.      CARDIOMEDIASTINAL SILHOUETTE:  Cardiomediastinal silhouette is normal in size and configuration.      LUNGS:  Lungs appear clear. No pleural effusion or pneumothorax.      ABDOMEN:  No remarkable upper abdominal findings.      BONES:  No acute osseous changes.      Impression: 1.  No evidence of acute cardiopulmonary process.              MACRO:  None      Signed by: Vick Berg 4/4/2025 5:01 AM  Dictation workstation:   [id number]

## 2025-04-04 NOTE — CONSULTS
Inpatient consult to Cardiology  Consult performed by: KELLY Gutierrez  Consult ordered by: Brayan Diane MD  Reason for consult: atrial fibrillation with RVR s/p cardioversion in ER            Cardiology Consult Note  Patient: Brandon Schmidt  Unit/Bed: 821/821-A  YOB: 1995  MRN: [id number]  Acct: [id number]   Admitting Diagnosis: Atrial fibrillation with rapid ventricular response (Multi) [I48.91]  Atrial fibrillation, unspecified type (Multi) [I48.91]  Date:  4/4/2025  Hospital Day: 0  Attending: Brayan Diane MD    Consultant: Dr. Chacho Harper  / Allie Cleary CNP  Primary Cardiologist:          Complaint:  Chief Complaint   Patient presents with    Rapid Heart Rate     Hx of afib         History of Present Illness:  30-year-old male evaluated [facility] emergency room with complaints of palpitations that started at 3:00 this morning.  He has extensive cardiac history of atrial fibrillation with multiple cardioversions and PVI in the past.  Patient has been on metoprolol succinate 100 mg daily but off anticoagulation.  In the emergency room he was treated with metoprolol 5 mg IV x 2 with no response and subsequently underwent cardioversion which was successful.  Initial EKG showed atrial fibrillation heart rate 129 bpm and QTc 433 ms.  EKG post cardioversion showed Sinus rhythm with heart rate of 100 bpm and QTc 477 ms.  Laboratory values in the emergency room showed a potassium of 3.6, BUN of 0.81 GFR greater than 90, TSH of 4.54, free thyroxine of 0.78, WBCs of 14.1, and chest x-ray showing no evidence of acute cardiopulmonary process.  Troponin levels and BMPs negative, and magnesium level 1.93.  Patient was admitted to medicine service for further evaluation.  Patient has a past medical history of paroxysmal atrial fibrillation since 2019.  He states he has had multiple cardioversions with the last one in January 2024.  He states he has maintained a  "regular rhythm until last night, patient is usually symptomatic with palpitations.  In 2022 he underwent a PVI at [facility] in [facility].  He had been treated with amiodarone and anticoagulation with Xarelto in the past and is currently on metoprolol.  He had a \"negative\" nuclear stress test in the past either in 2022 /2023.  He was initially thought to have a nonischemic cardiomyopathy with a EF 35%, that then improved.  Patient also states he had a cardiac MRI in the past which was negative for infiltrative disease.  His father has a history of atrial fibrillation as well as his paternal grandfather.    Allergies:  No Known Allergies     PMHx:  No past medical history on file.    PSHx:  No past surgical history on file.    Social Hx:  Social History     Socioeconomic History    Marital status: Single     Social Drivers of Health     Food Insecurity: No Food Insecurity (1/22/2025)    Received from [facility]    Hunger Vital Sign     Worried About Running Out of Food in the Last Year: Never true     Ran Out of Food in the Last Year: Never true   Transportation Needs: No Transportation Needs (1/22/2025)    Received from [facility]    PRAPARE - Transportation     Lack of Transportation (Medical): No     Lack of Transportation (Non-Medical): No   Housing Stability: Low Risk  (1/22/2025)    Received from [facility]    Housing Stability Vital Sign     Unable to Pay for Housing in the Last Year: No     Number of Times Moved in the Last Year: 0     Homeless in the Last Year: No       Family Hx:  No family history on file.    Review of Systems:   Review of Systems   All other systems reviewed and are negative.        Physical Examination:    Visit Vitals  /77 (BP Location: Right arm, Patient Position: Sitting)   Pulse 88   Temp 37.1 °C (98.8 °F) (Temporal)   Resp 18      Physical Exam  Constitutional:       Appearance: Normal " appearance. He is obese.   HENT:      Head: Normocephalic.   Eyes:      Conjunctiva/sclera: Conjunctivae normal.   Cardiovascular:      Rate and Rhythm: Normal rate and regular rhythm.      Pulses: Normal pulses.      Heart sounds: Normal heart sounds.   Pulmonary:      Effort: Pulmonary effort is normal.      Breath sounds: Normal breath sounds.   Musculoskeletal:         General: Normal range of motion.   Skin:     General: Skin is warm and dry.   Neurological:      General: No focal deficit present.      Mental Status: He is alert and oriented to person, place, and time.   Psychiatric:         Mood and Affect: Mood normal.         Behavior: Behavior normal.         LABS:  Results for orders placed or performed during the hospital encounter of 04/04/25 (from the past 96 hours)   CBC and Auto Differential   Result Value Ref Range    WBC 14.1 (H) 4.4 - 11.3 x10*3/uL    nRBC 0.0 0.0 - 0.0 /100 WBCs    RBC 5.32 4.50 - 5.90 x10*6/uL    Hemoglobin 15.5 13.5 - 17.5 g/dL    Hematocrit 47.0 41.0 - 52.0 %    MCV 88 80 - 100 fL    MCH 29.1 26.0 - 34.0 pg    MCHC 33.0 32.0 - 36.0 g/dL    RDW 13.6 11.5 - 14.5 %    Platelets 244 150 - 450 x10*3/uL    Neutrophils % 61.5 40.0 - 80.0 %    Immature Granulocytes %, Automated 0.5 0.0 - 0.9 %    Lymphocytes % 28.0 13.0 - 44.0 %    Monocytes % 7.6 2.0 - 10.0 %    Eosinophils % 1.9 0.0 - 6.0 %    Basophils % 0.5 0.0 - 2.0 %    Neutrophils Absolute 8.67 (H) 1.20 - 7.70 x10*3/uL    Immature Granulocytes Absolute, Automated 0.07 0.00 - 0.70 x10*3/uL    Lymphocytes Absolute 3.96 1.20 - 4.80 x10*3/uL    Monocytes Absolute 1.08 (H) 0.10 - 1.00 x10*3/uL    Eosinophils Absolute 0.27 0.00 - 0.70 x10*3/uL    Basophils Absolute 0.07 0.00 - 0.10 x10*3/uL   Comprehensive metabolic panel   Result Value Ref Range    Glucose 126 (H) 74 - 99 mg/dL    Sodium 138 136 - 145 mmol/L    Potassium 3.6 3.5 - 5.3 mmol/L    Chloride 102 98 - 107 mmol/L    Bicarbonate 26 21 - 32 mmol/L    Anion Gap 14 10 - 20  mmol/L    Urea Nitrogen 14 6 - 23 mg/dL    Creatinine 0.81 0.50 - 1.30 mg/dL    eGFR >90 >60 mL/min/1.73m*2    Calcium 9.0 8.6 - 10.3 mg/dL    Albumin 4.0 3.4 - 5.0 g/dL    Alkaline Phosphatase 84 33 - 120 U/L    Total Protein 7.4 6.4 - 8.2 g/dL    AST 28 9 - 39 U/L    Bilirubin, Total 0.3 0.0 - 1.2 mg/dL    ALT 60 (H) 10 - 52 U/L   Magnesium   Result Value Ref Range    Magnesium 1.93 1.60 - 2.40 mg/dL   Phosphorus   Result Value Ref Range    Phosphorus 3.7 2.5 - 4.9 mg/dL   Protime-INR   Result Value Ref Range    Protime 10.6 9.8 - 12.4 seconds    INR 1.0 0.9 - 1.1   aPTT   Result Value Ref Range    aPTT 30 26 - 36 seconds   B-Type Natriuretic Peptide   Result Value Ref Range    BNP 10 0 - 99 pg/mL   Troponin I, High Sensitivity, Initial   Result Value Ref Range    Troponin I, High Sensitivity 5 0 - 20 ng/L   TSH with reflex to Free T4 if abnormal   Result Value Ref Range    Thyroid Stimulating Hormone 4.54 (H) 0.44 - 3.98 mIU/L   Thyroxine, Free   Result Value Ref Range    Thyroxine, Free 0.78 0.61 - 1.12 ng/dL   ECG 12 lead   Result Value Ref Range    Ventricular Rate 129 BPM    Atrial Rate 138 BPM    QRS Duration 96 ms    QT Interval 296 ms    QTC Calculation(Bazett) 433 ms    R Axis 42 degrees    T Axis 14 degrees    QRS Count 21 beats    Q Onset 219 ms    T Offset 367 ms    QTC Fredericia 382 ms   Troponin, High Sensitivity, 1 Hour   Result Value Ref Range    Troponin I, High Sensitivity 7 0 - 20 ng/L   ECG 12 lead   Result Value Ref Range    Ventricular Rate 100 BPM    Atrial Rate 100 BPM    IN Interval 178 ms    QRS Duration 96 ms    QT Interval 370 ms    QTC Calculation(Bazett) 477 ms    P Axis 46 degrees    R Axis 34 degrees    T Axis 6 degrees    QRS Count 16 beats    Q Onset 217 ms    P Onset 128 ms    P Offset 172 ms    T Offset 402 ms    QTC Fredericia 439 ms          Current Medications:  Scheduled medications  apixaban, 5 mg, oral, BID  metoprolol succinate XL, 100 mg, oral, Daily      Continuous  medications     PRN medications  PRN medications: acetaminophen, ondansetron, polyethylene glycol     CT head wo IV contrast    Result Date: 10/18/2023  Interpreted By:  Tonny Garcia, STUDY: CT HEAD WO IV CONTRAST;  10/18/2023 7:02 am   INDICATION: Signs/Symptoms:AMS.   COMPARISON: CT Brain, 4 March 2020   ACCESSION NUMBER(S): [id number]   ORDERING CLINICIAN: NEPTALI MONTANA   TECHNIQUE: CT of the brain from the skull vertex to the skull base, without intravenous contrast   FINDINGS: ACUTE INTRA-AXIAL HEMORRHAGE:  Negative   ACUTE EXTRA-AXIAL/SUBDURAL HEMORRHAGE:  Negative   ACUTE INTRACRANIAL MASS EFFECT:  Negative   CT EVIDENCE OF ACUTE / SUBACUTE TERRITORIAL ISCHEMIA:  Negative   VENTRICLES:  Unchanged diffuse dilation. No acute obstructive hydrocephalus by CT   OTHER BRAIN FINDINGS:  No significant interval change to the CT appearance of the brain including the degree of atrophy and deep white matter changes from chronic microvascular disease and encephalomalacia in the left occipital lobe from old infarct and another in or near the left thalamus   INCLUDED PARANASAL SINUSES: All clear   INCLUDED MASTOID AIR CELLS: All clear   SKULL:  No lytic or blastic lesion   EXTRACRANIAL SOFT TISSUES:  Scalp and occular globes grossly normal by CT       NO ACUTE INTRACRANIAL PROCESS   NO SIGNIFICANT INTERVAL CHANGE TO THE CT APPEARANCE OF THE BRAIN WHEN COMPARED TO 4 MARCH 2020   MACRO: None   Signed by: Tonny Garcia 10/18/2023 7:59 AM Dictation workstation:   [id number]    CT abdomen pelvis w IV contrast    Result Date: 10/18/2023  Interpreted By:  Waleska Felix, STUDY: CT ABDOMEN PELVIS W IV CONTRAST;  10/18/2023 12:46 am   INDICATION: Signs/Symptoms:vomiting.   COMPARISON: None.   ACCESSION NUMBER(S): [id number]   ORDERING CLINICIAN: LELA NOLAN   TECHNIQUE: Axial CT images of the abdomen and pelvis with coronal and sagittal reconstructed images obtained after intravenous administration of contrast   FINDINGS:  LOWER CHEST: Mild bibasilar dependent atelectasis. Pacemaker/AICD leads noted the right atrium and right ventricle. Mitral annulus calcifications noted. BONES: No acute osseous abnormality. Mild multilevel degenerative disc changes. ABDOMINAL WALL: Within normal limits.   ABDOMEN:   LIVER: Within normal limits. BILE DUCTS: No biliary dilatation. GALLBLADDER: No calcified gallstones. No pericholecystic inflammatory changes. PANCREAS: Within normal limits. SPLEEN: Within normal limits. ADRENALS: Within normal limits. KIDNEYS and URETERS: Partial staghorn calculus versus numerous layering calculi in the right kidney. Mild right hydronephrosis. Mild right urothelial thickening, particularly in the distal ureter. No calculus along the course of the ureter. No parenchymal abnormality to suggest pyelonephritis. No hydronephrosis on the left.     VESSELS: No aortic aneurysm. RETROPERITONEUM: No pathologically enlarged retroperitoneal lymph nodes.   PELVIS:   REPRODUCTIVE ORGANS: No pelvic masses. BLADDER: Diffuse urinary bladder wall thickening and mucosal hyperenhancement.   BOWEL: The stomach is moderately distended. No dilated small bowel. Large amount of stool in the rectum. There is significant narrowing of the ascending colon and cecum, with the small bowel loops extending lateral to the cecum and ascending colon (series 301, image 128). The appendix is not seen with certainty. There are no pericecal inflammatory changes to suggest acute appendicitis. PERITONEUM: No ascites or free air, no fluid collection.       Findings suspicious for cystitis with possible right ascending UTI/pyelitis.   Large amount of stool in the rectum suggesting fecal impaction.   Suspected internal pericecal hernia. No evidence of bowel obstruction.   MACRO: None   Signed by: Waleska Felix 10/18/2023 1:42 AM Dictation workstation:   [id number]    XR chest 1 view    Result Date: 10/18/2023  Interpreted By:  Isaías Gutierrez, STUDY: XR CHEST  "1 VIEW;  10/17/2023 11:16 pm   INDICATION: Signs/Symptoms:nausea and vomiting.   COMPARISON: None.   ACCESSION NUMBER(S): [id number]   ORDERING CLINICIAN: LELA NOLAN   FINDINGS: Dual lead left-sided pacemaker.   The cardiomediastinal silhouette and pulmonary vasculature are within normal limits. No consolidation, pleural effusion or pneumothorax.   Chronic right mid clavicle fracture.       No acute cardiopulmonary process.     MACRO: None.   Signed by: Isaías Gutierrez 10/18/2023 12:15 AM Dictation workstation:   [id number]     .No echocardiogram results found for the past 14 days     Encounter Date: 04/04/25   ECG 12 lead   Result Value    Ventricular Rate 100    Atrial Rate 100    KY Interval 178    QRS Duration 96    QT Interval 370    QTC Calculation(Bazett) 477    P Axis 46    R Axis 34    T Axis 6    QRS Count 16    Q Onset 217    P Onset 128    P Offset 172    T Offset 402    QTC Fredericia 439    Narrative    Normal sinus rhythm  Normal ECG  When compared with ECG of 04-APR-2025 04:02, (unconfirmed)  Sinus rhythm has replaced Atrial fibrillation  See ED provider note for full interpretation and clinical correlation  Confirmed by Constance Ceballos ([id number]) on 4/4/2025 10:47:15 AM            I/O:    Intake/Output Summary (Last 24 hours) at 4/4/2025 1251  Last data filed at 4/4/2025 0514  Gross per 24 hour   Intake 50 ml   Output --   Net 50 ml        Assessment:    Patient Active Problem List   Diagnosis    Atrial fibrillation with rapid ventricular response (Multi)   Clinical impression:  1.  Paroxysmal atrial fibrillation currently on metoprolol, not on anticoagulation.  Treated in the emergency room with metoprolol IV x 2 doses and then cardioversion  2.  PVI and 2022 and multiple cardioversions in the past treated with amiodarone and Xarelto at [facility] in [facility]  3.  \"Negative \"nuclear stress test 2022/2023  4.  History of nonischemic cardiomyopathy and LVEF 35%, since " improved  5.  Obstructive sleep apnea unable to tolerate CPAP  6.  Morbid obesity  7.  Hypertension    Plan:  Continue metoprolol succinate 100 mg daily  Begin Eliquis 5 mg twice daily  Lengthy discussion with patient regarding atrial fibrillation and history up to this point of treatment, discussed treatment options including antiarrhythmic medications, redo PVI, and lifestyle modifications including reduced caffeine, weight loss, and exercise.  Awaiting echocardiogram  Obtain records from [facility] in [facility]  Outpatient follow-up with Dr. Harper     Electronically signed by KELLY Gutierrez on 4/4/2025 at 12:51 PM       Patient was seen, chart reviewed.    Patient was admitted for palpitations.  Patient has a history of atrial fibrillation and multiple cardioversions in the past while he was living in [place].  Status post PVI.  In emergency department, EKG was consistent with atrial fibrillation with ventricular response  Patient is back in sinus rhythm.  Patient needed to be cardioverted while in the emergency department.  Long conversation with patient will plan to follow for management of atrial fibrillation.  Continue with anticoagulation for now for the next 2 to 3 weeks.  If he is planning to go for PVI in the near future then anticoagulant is to be continued.  Continue telemetry or patient can be discharged home and follow-up with our office in the next 2 to 4 weeks with a Holter monitor  We will discuss again redo PVI in the next few weeks.    Risk factor modification and lifestyle modification discussed with patient. Diet , exercise and hydration discussed with patient.    Please excuse any errors in grammar or translation related to this dictation.  Voice recognition software was utilized to prepare this document.

## 2025-04-04 NOTE — ED PROCEDURE NOTE
RT at pt bedside for cardioversion, pt placed on 2L NC, ETCO2 monitor on. Pt needed oral airway and increased O2. After procedure pt awake and breathing on his own.

## 2025-04-04 NOTE — ED PROVIDER NOTES
HPI   Chief Complaint   Patient presents with    Rapid Heart Rate     Hx of afib        Pt is a 31 y/o M w/ a PMHx of SHAILA and atrial fibrillation who p/w heart palpitations. The patient states he has been in sinus for months now and last seen in April 2024 requiring cardioversion. Pt states that he felt the palpitations and fatigued which is his usual symptoms. He has not had syncope chest discomfort, nausea, vomiting, abdominal pain, new LE edema, fevers and take his Metop Succ 100mg daily that he took as usual n afternoon yesterday. 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 Pt states that he has also moved here from [place] recently and has an appointment to establish primary care ut has not seen a cardiologist.               Patient History   No past medical history on file.  No past surgical history on file.  No family history on file.  Social History     Tobacco Use    Smoking status: Not on file    Smokeless tobacco: Not on file   Substance Use Topics    Alcohol use: Not on file    Drug use: Not on file       Physical Exam   ED Triage Vitals [04/04/25 0408]   Temperature Heart Rate Respirations BP   36.7 °C (98.1 °F) (!) 155 18 117/82      Pulse Ox Temp Source Heart Rate Source Patient Position   97 % Temporal Monitor Lying      BP Location FiO2 (%)     Right arm --       Physical Exam  Constitutional:       General: He is not in acute distress.     Appearance: He is not ill-appearing.      Comments: Obese young appearing male   HENT:      Mouth/Throat:      Mouth: Mucous membranes are moist.   Eyes:      General: No scleral icterus.     Extraocular Movements: Extraocular movements intact.      Pupils: Pupils are equal, round, and reactive to light.   Cardiovascular:      Rate and Rhythm:  Tachycardia present. Rhythm irregular.      Heart sounds: No murmur heard.  Pulmonary:      Effort: Pulmonary effort is normal. No respiratory distress.      Breath sounds: Normal breath sounds. No wheezing or rales.   Abdominal:      General: Abdomen is flat. Bowel sounds are normal.      Palpations: Abdomen is soft.      Tenderness: There is no abdominal tenderness.   Musculoskeletal:      Right lower leg: No edema.      Left lower leg: No edema.   Skin:     General: Skin is warm and dry.      Capillary Refill: Capillary refill takes less than 2 seconds.   Neurological:      General: No focal deficit present.      Mental Status: He is alert and oriented to person, place, and time.      Cranial Nerves: No cranial nerve deficit.      Motor: No weakness.   Psychiatric:         Mood and Affect: Mood normal.         Behavior: Behavior normal.           ED Course & MDM   ED Course as of 04/04/25 2036 Fri Apr 04, 2025   0527 Tolerated conscious sedation and cardioversion well now back in sinus. Giving IV mag and IV fluids and repeating EKG and will admit to medicine for tele monitoring and EP evaluation. [BARBARA]      ED Course User Index  [BARBARA] Jasen Chun MD         Diagnoses as of 04/04/25 2036   Atrial fibrillation, unspecified type (Multi)                 No data recorded     [place] Coma Scale Score: 15 (04/04/25 0417 : Nga Kruse RN)                           Medical Decision Making  Pt is a 29 y/o M w/ a PMHx of SHAILA and atrial fibrillation who p/w heart palpitations due to sudden onset atrial fibrillation without hemodynamic instability.  Will get:  - CBC, CMP, BNP, Trop, Coags, Mag, Phos  - Will replace electrolytes s needed  - CXR  - Will give metoprolol 5mg x 2 and if no response discussed with patient about cardioversion. I discussed risk for possible stroke although very good historian about time frame so risk is lower. Patient expressed understanding and consented for cardioversion if needed  -  Will need admission for monitoring and EP eval and will consider starting on Amiodarone as needed    EKG Examined and Independently Interpreted by me:  Atrial fibrillation with RVR to 129bpm  No STEs or TWIs that are new  No new ST Depressions  No Wellen's, Brugada, or AV blocks          Procedure  Moderate Sedation    Performed by: Jasen Chun MD  Authorized by: Jasen Chun MD    Consent:     Consent obtained:  Verbal    Consent given by:  Patient    Risks discussed:  Inadequate sedation, respiratory compromise necessitating ventilatory assistance and intubation and prolonged sedation necessitating reversal  Universal protocol:     Protocol observed: The universal protocol was observed before the procedure and is documented in the nursing flowsheets    Indications:     Procedure performed:  Cardioversion    Procedure necessitating sedation performed by:  Physician performing sedation    Level of sedation:  Moderate  Pre-sedation assessment:     Mouth openin finger widths    Mallampati score:  III - soft palate, base of uvula visible    Neck mobility: normal      History of difficult intubation: no    Immediate pre-procedure details:     Monitoring: The patient is on appropriate monitoring (Including: 3 or 5 lead EKG, Pulse Oximetry, Capnography, and Blood Pressure monitoring), oxygenation has been addressed, and critical airway and emergency equipment is immediately available before the initiation of sedation      Reassessment: Patient reassessed immediately prior to procedure      Reviewed: vital signs and relevant labs/tests    Procedure details (see MAR for exact dosages):     Sedation start time:  2025 5:12 AM    Intra-procedure events: hypoxemia: oxygen saturation < 85% or 10% drop in baseline    Intra-procedure management:  Airway repositioning and oropharyngeal airway    Sedation end time:  2025 5:23 AM    Total sedation time (minutes):  11  Post-procedure details:      Attendance: Constant attendance by certified staff until patient recovered      Procedure completion:  Tolerated well, no immediate complications  Electrical Cardioversion    Performed by: Jasen Chun MD  Authorized by: Jasen Chun MD    Consent:     Consent obtained:  Verbal    Consent given by:  Patient    Risks, benefits, and alternatives were discussed: yes      Risks discussed:  Induced arrhythmia  Universal protocol:     Patient identity confirmed:  Arm band  Pre-procedure details:     Rhythm:  Atrial fibrillation    Electrode placement:  Anterior-lateral  Attempt one:     Cardioversion mode:  Synchronous    Shock (joules) attempt one: 300.    Shock outcome:  Conversion to normal sinus rhythm  Post-procedure details:     Patient status:  Alert    Procedure completion:  Tolerated well, no immediate complications       Jasen Chun MD  04/04/25 0432       Jasen Chun MD  04/04/25 0527       Jasen Chun MD  04/04/25 9080

## 2025-04-04 NOTE — PROGRESS NOTES
Eliquis 30 day free trial delivered to bedside. Purpose and dosing instructions provided and use of $10 copay. Patient verbalized understanding.

## 2025-04-07 ENCOUNTER — APPOINTMENT (OUTPATIENT)
Dept: PRIMARY CARE | Facility: CLINIC | Age: 30
End: 2025-04-07
Payer: COMMERCIAL

## 2025-04-07 ENCOUNTER — PATIENT MESSAGE (OUTPATIENT)
Dept: PRIMARY CARE | Facility: CLINIC | Age: 30
End: 2025-04-07

## 2025-04-07 ENCOUNTER — PATIENT OUTREACH (OUTPATIENT)
Dept: CARDIOLOGY | Facility: CLINIC | Age: 30
End: 2025-04-07

## 2025-04-07 VITALS
OXYGEN SATURATION: 97 % | SYSTOLIC BLOOD PRESSURE: 150 MMHG | BODY MASS INDEX: 40.43 KG/M2 | HEART RATE: 107 BPM | WEIGHT: 315 LBS | TEMPERATURE: 99.1 F | DIASTOLIC BLOOD PRESSURE: 94 MMHG | HEIGHT: 74 IN

## 2025-04-07 DIAGNOSIS — I48.0 PAROXYSMAL ATRIAL FIBRILLATION (MULTI): ICD-10-CM

## 2025-04-07 DIAGNOSIS — I10 BENIGN ESSENTIAL HTN: ICD-10-CM

## 2025-04-07 DIAGNOSIS — G47.33 OBSTRUCTIVE SLEEP APNEA SYNDROME: ICD-10-CM

## 2025-04-07 DIAGNOSIS — E66.1 CLASS 3 DRUG-INDUCED OBESITY WITH SERIOUS COMORBIDITY AND BODY MASS INDEX (BMI) OF 50.0 TO 59.9 IN ADULT: ICD-10-CM

## 2025-04-07 DIAGNOSIS — E66.813 CLASS 3 DRUG-INDUCED OBESITY WITH SERIOUS COMORBIDITY AND BODY MASS INDEX (BMI) OF 50.0 TO 59.9 IN ADULT: ICD-10-CM

## 2025-04-07 DIAGNOSIS — Z09 HOSPITAL DISCHARGE FOLLOW-UP: Primary | ICD-10-CM

## 2025-04-07 PROBLEM — G47.30 SLEEP APNEA: Status: ACTIVE | Noted: 2025-04-07

## 2025-04-07 PROBLEM — I48.91 ATRIAL FIBRILLATION (MULTI): Status: ACTIVE | Noted: 2025-04-07

## 2025-04-07 PROBLEM — E66.9 OBESITY: Status: ACTIVE | Noted: 2025-04-07

## 2025-04-07 PROCEDURE — 3080F DIAST BP >= 90 MM HG: CPT | Performed by: INTERNAL MEDICINE

## 2025-04-07 PROCEDURE — 3077F SYST BP >= 140 MM HG: CPT | Performed by: INTERNAL MEDICINE

## 2025-04-07 PROCEDURE — 99496 TRANSJ CARE MGMT HIGH F2F 7D: CPT | Performed by: INTERNAL MEDICINE

## 2025-04-07 PROCEDURE — 3008F BODY MASS INDEX DOCD: CPT | Performed by: INTERNAL MEDICINE

## 2025-04-07 PROCEDURE — 1036F TOBACCO NON-USER: CPT | Performed by: INTERNAL MEDICINE

## 2025-04-07 RX ORDER — LISINOPRIL AND HYDROCHLOROTHIAZIDE 20; 25 MG/1; MG/1
1 TABLET ORAL DAILY
Qty: 30 TABLET | Refills: 11 | Status: SHIPPED | OUTPATIENT
Start: 2025-04-07 | End: 2026-04-07

## 2025-04-07 ASSESSMENT — PATIENT HEALTH QUESTIONNAIRE - PHQ9
SUM OF ALL RESPONSES TO PHQ9 QUESTIONS 1 AND 2: 0
1. LITTLE INTEREST OR PLEASURE IN DOING THINGS: NOT AT ALL
2. FEELING DOWN, DEPRESSED OR HOPELESS: NOT AT ALL

## 2025-04-07 NOTE — PROGRESS NOTES
Discharge Facility:  [facility]  Discharge Diagnosis:  A-fib with RVR  Admission Date:  4/4/25 OBS  Discharge Date:     PCP Appointment Date:   APR 7 2025 03:45 PM - Primary Care Hospital Discharge   [facility] - Ganesh Young MD     Specialist Appointment Date:   MAY 21  2025 01:15 PM - Cardiology Hospital Discharge  [facility] - Chacho Harper MD     Hospital Encounter and Summary Linked: Yes  Discharge Summary by Brayan Diane MD (04/04/2025 14:28)   ED to Hosp-Admission (Discharged) with Brayan Diane MD; Jasen Chun MD (04/04/2025)       Patient has an appt within 2 days of discharge

## 2025-04-08 NOTE — PROGRESS NOTES
Subjective     Patient ID: Brandon Schmidt is a 30 y.o. male who presents for Hospital Follow-up, Atrial Fibrillation, and Hypertension.  History of Present Illness  Brandon Schmidt is a 30 year old male with atrial fibrillation who presents for follow-up after hospitalization.    He was hospitalized on April 4th for atrial fibrillation with a high heart rate. He has a history of atrial fibrillation since 2019, initially managed with metoprolol and Xarelto, and underwent ablation in 2022, which was effective for about a year. He experienced a recurrence of atrial fibrillation, which he attributes to sleep apnea. During the recent hospitalization, he was started on blood thinners and has a follow-up appointment with a cardiologist.    He has a history of sleep apnea and has struggled with using a CPAP machine, reporting waking up panicked and unable to breathe despite trying different masks and settings. He has consulted with a sleep  and has tried various CPAP masks, including full face and nasal pillows, but continues to have issues. He has not been using the CPAP consistently due to these difficulties.    He has a history of obesity and is seeking assistance with weight loss. He has experienced depression related to his weight and is interested in exploring dietary plans and medications to aid in weight management. He recently joined a gym to increase his physical activity.    He has been on metoprolol for blood pressure management for several years. He reports fluctuating blood pressure readings, with some high readings noted during his recent hospitalization. He has not been on any other blood pressure medications previously.    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    Objective   Vitals:    04/07/25 1555   BP: (!) 150/94   BP Location: Right arm   Patient Position: Sitting   Pulse: 107   Temp: 37.3 °C (99.1 °F)   TempSrc: Temporal   SpO2: 97%   Weight:  "(!) 182 kg (401 lb 9.6 oz)   Height: 1.88 m (6' 2\")     Wt Readings from Last 10 Encounters:   04/07/25 (!) 182 kg (401 lb 9.6 oz)   04/04/25 (!) 159 kg (350 lb)       Medication  Current Outpatient Medications   Medication Instructions    Eliquis 5 mg, oral, 2 times daily    lisinopriL-hydrochlorothiazide 20-25 mg tablet 1 tablet, oral, Daily    metoprolol succinate XL (Toprol-XL) 100 mg 24 hr tablet Take 1 tablet (100 mg) by mouth once daily.    tirzepatide (weight loss) (ZEPBOUND) 2.5 mg, subcutaneous, Every 7 days       Physical Exam  Gen: Alert, awake, Oriented X 3. Not in any acute distress   HEENT:  Atraumatic, PERRL.  Conjunctivae clear.   Moist nasal mucous membranes. oropharynx non erythematous,   Neck:  Supple without thyromegaly or lymphadenopathy.  Lungs:  Clear to auscultation without rales, rhonchi, or rub.  Heart:  RRR, S1, S2, without M.  Abdomen:  Soft, non tender, no organ enlargement, bruit. Bowel sounds present . No CVA tenderness.  Extremities:  No edema. No calf swelling or tenderness.    Skin:  No rash, ecchymosis or erythema.      Review of Systems   Constitutional:  No activity change or fever   HENT:  Denies ringing ears or nose bleed   Respiratory:  Denies stridor. No blood in sputum   Cardiovascular:  Denies chest pain, no sudden excessive sweating   Gastrointestinal:  No sour burping, no blood in stool    Genitourinary:  Denies blood in urine    Musculoskeletal:  No joint redness or swelling    Skin:  No new spot changing color or shape or border    Neurological:  No speech difficulty, facial droop    Psychiatric/Behavioral:  No agitation, denies Hallucination     Recent Labs   Lab Results   Component Value Date    WBC 14.1 (H) 04/04/2025    HGB 15.5 04/04/2025    HCT 47.0 04/04/2025     04/04/2025    ALT 60 (H) 04/04/2025    AST 28 04/04/2025     04/04/2025    K 3.6 04/04/2025     04/04/2025    CREATININE 0.81 04/04/2025    BUN 14 04/04/2025    CO2 26 04/04/2025    " "TSH 4.54 (H) 04/04/2025    INR 1.0 04/04/2025    HGBA1C 6.2 (H) 04/04/2025     Lab Results   Component Value Date    GLUCOSE 126 (H) 04/04/2025    CALCIUM 9.0 04/04/2025     04/04/2025    K 3.6 04/04/2025    CO2 26 04/04/2025     04/04/2025    BUN 14 04/04/2025    CREATININE 0.81 04/04/2025      No results found for: \"LDLCALC\"  Lab Results   Component Value Date    HGBA1C 6.2 (H) 04/04/2025     Lab Results   Component Value Date    CREATININE 0.81 04/04/2025         Assessment/Plan     1. Hospital discharge follow-up  Post discharge F/U; all concerns and questions were answered. Patient is now back to normal self and at baseline.      2. Class 3 drug-induced obesity with serious comorbidity and body mass index (BMI) of 50.0 to 59.9 in adult  tirzepatide, weight loss, (Zepbound) 2.5 mg/0.5 mL injection    Referral to Nutrition Services      3. Paroxysmal atrial fibrillation (Multi)  Currently on metoprolol and Eliquis.  Will be seeing electrophysiologist in the next week status post ablation      4. Obstructive sleep apnea syndrome  Encouraged to be Faith about CPAP use      5. Benign essential HTN  lisinopriL-hydrochlorothiazide 20-25 mg tablet        Assessment & Plan  Atrial Fibrillation  Atrial fibrillation with high heart rate, recurrent post-ablation, likely exacerbated by sleep apnea and obesity. Initiated Eliquis during hospitalization. Blood pressure fluctuating, requiring additional management.  - Continue Eliquis as prescribed.  - Follow up with cardiologist Dr. Harper for further management.  - Start lisinopril and hydrochlorothiazide for hypertension.    Obstructive Sleep Apnea  Obstructive sleep apnea poorly managed with CPAP due to discomfort. CPAP and weight loss emphasized. Inspire implant not recommended.  - Encourage continued CPAP therapy attempts.  - Consider weight loss strategies.    Obesity  Obesity contributing to sleep apnea and possibly atrial fibrillation. Patient motivated " for weight loss through lifestyle changes and medication. Discussed Zepbound (Mounjaro) pending insurance approval.  - Prescribe Zepbound (Mounjaro) 2.5 mg pending insurance approval.  - Refer to nutritionist for dietary counseling.  - Encourage regular physical activity and gradual endurance increase.          VISIT SUMMARY:  Brandon Schmidt, a 30-year-old male with a history of atrial fibrillation, sleep apnea, and obesity, visited for a follow-up after a recent hospitalization for atrial fibrillation. He has been experiencing difficulties with CPAP therapy for sleep apnea and is seeking assistance with weight loss. His blood pressure has been fluctuating, and he is interested in exploring dietary plans and medications to aid in weight management.    YOUR PLAN:  -ATRIAL FIBRILLATION: Atrial fibrillation is an irregular and often rapid heart rate that can increase the risk of strokes, heart failure, and other heart-related complications. You should continue taking Eliquis as prescribed and follow up with your cardiologist, Dr. Harper, for further management. Additionally, you will start taking lisinopril and hydrochlorothiazide to help manage your blood pressure.    -OBSTRUCTIVE SLEEP APNEA: Obstructive sleep apnea is a condition where the airway becomes blocked during sleep, causing breathing to repeatedly stop and start. You should continue trying to use your CPAP machine and consider weight loss strategies to help manage this condition.    -OBESITY: Obesity is a condition characterized by excessive body fat, which can contribute to various health issues, including sleep apnea and atrial fibrillation. You will be prescribed Zepbound (Mounjaro) 2.5 mg pending insurance approval, referred to a nutritionist for dietary counseling, and encouraged to engage in regular physical activity with a gradual increase in endurance.    INSTRUCTIONS:  Please follow up with your cardiologist, Dr. Harper, for further management of  "your atrial fibrillation. Additionally, you will be referred to a nutritionist for dietary counseling. Continue using your CPAP machine and work on weight loss strategies. Start taking lisinopril and hydrochlorothiazide for blood pressure management as prescribed.        This medical note was created with the assistance of artificial intelligence (AI) for documentation purposes. The content has been reviewed and confirmed by the healthcare provider for accuracy and completeness. Patient consented to the use of audio recording and use of AI during their visit. Patient: Brandon Schmidt  : 1995  PCP: Ganesh Young MD  MRN: [id number]  Program: Transitional Care Management  Status: Enrolled  Effective Dates: 2025 - present  Responsible Staff: Scarlet Campos RN  Social Drivers to be Addressed: Alcohol Use, Depression, Financial Resource Strain, Physical Activity, Social Connections, Stress, Tobacco Use, Transportation Needs         Brandon Schmidt is a 30 y.o. male presenting today for follow-up after being discharged from the hospital 7 days ago. The main problem requiring admission was A-fib with RVR. The discharge summary and/or Transitional Care Management documentation was reviewed. Medication reconciliation was performed as indicated via the \"Tonny as Reviewed\" timestamp.     Brandon Schmidt was contacted by Transitional Care Management services two days after his discharge. This encounter and supporting documentation was reviewed.  I have reviewed ER visit summary, assessment and working diagnoses, relevant imaging studies, biochemical lab results and EKG. I have also reviewed hospital course, recommendations, med reconciliation, discharge diagnoses and follow up plan.  The complexity of medical decision making for this patient's transitional care is     high.      "

## 2025-04-18 ENCOUNTER — PATIENT OUTREACH (OUTPATIENT)
Dept: PRIMARY CARE | Facility: CLINIC | Age: 30
End: 2025-04-18
Payer: COMMERCIAL

## 2025-04-18 NOTE — PROGRESS NOTES
Confirmation of at least 2 patient identifiers.    Completed telephonic follow-up with patient after recent visit with Dr Young   Spoke to patient    Patient reports feeling: Improved    Patient has questions or concerns about medications: No    Have all prescribed medications been filled? Yes    Patient has necessary resources to manage their care? Yes    Patient has questions or concerns? No    Patient denies Heart palpitations and states Bp has improved. Patient is doing research into zepbound/Wegovy medications    Next care management follow-up approximately within one month.  Care  provided to patient.

## 2025-04-23 ENCOUNTER — APPOINTMENT (OUTPATIENT)
Dept: PRIMARY CARE | Facility: CLINIC | Age: 30
End: 2025-04-23
Payer: COMMERCIAL

## 2025-04-24 NOTE — PROGRESS NOTES
Pharmacist Clinic: Weight Loss     Brandon Schmidt is a 30 y.o. male who presents for evaluation of GLPra medications for weight loss.     Referring Provider: Ganesh Young MD    Subjective    Patient is seeking to get GLP1ra as a therapeutic option for weight management. Patient is a candidate for weight loss management based on BMI >30. Patient {Has/has not:49554} tried medication management for weight loss in the past. {HE/SHE:91867} endorses attempting lifestyle modifications such as diet/exercise. Patient has no familial hx of MEN2/MTC, or pancreatitis. Last A1c (***) done WNL. Looking to start GLP1 therapy today for BMI reduction.    Objective   Wt Readings from Last 3 Encounters:   04/07/25 (!) 182 kg (401 lb 9.6 oz)   04/04/25 (!) 159 kg (350 lb)      There is no height or weight on file to calculate BMI.     Assessment/Plan   Problem List Items Addressed This Visit    None      Despite medical necessity and recommendation for GLP1ra to aid in weight management, the insurance provider has denied coverage.    assistance programs will not work without some sort of prescription coverage.   Patient does not qualify for  patient assistance without some sort of prescription coverage.   Patient's only options include:  Pay out of pocket (~$900-1100 per month).  [employer] offers a savings card for patients commercially insured without coverage for Zepbound (tirzepatide) for $550/month.     Plan:  Patient has expressed reluctance to pursue any of the options listed above at this time. Provided contact information should the patient choose to reconsider.     Carlito Stiles, PharmD    Continue all meds under the continuation of care with the referring provider and clinical pharmacy team.     TBW) since starting therapy plan. Due to PCP finding potential benefit for weight loss, plan to start semaglutide 0.3 mg once weekly.    Medication Changes:  START  Semaglutide 0.3 mg/0.25 mL once weekly    Future Considerations:  Titrate to maximum tolerated dose for weight loss benefit    Monitoring and Education:  Counseled patient on relevant medication mechanisms of action, expectations, side effects, duration of therapy, contraindications, administration, and monitoring parameters.  All questions and concerns addressed. Contact pharmacist with any further questions or concerns prior to next appointment.  Reviewed dietary recommendations: Healthy Plate method       Follow up: 5/28/2025 @ 1040    Carlito Stiles, PharmD    Continue all meds under the continuation of care with the referring provider and clinical pharmacy team.

## 2025-04-28 ENCOUNTER — APPOINTMENT (OUTPATIENT)
Dept: PHARMACY | Facility: HOSPITAL | Age: 30
End: 2025-04-28
Payer: COMMERCIAL

## 2025-04-28 DIAGNOSIS — E66.1 CLASS 3 DRUG-INDUCED OBESITY WITH SERIOUS COMORBIDITY AND BODY MASS INDEX (BMI) OF 50.0 TO 59.9 IN ADULT: ICD-10-CM

## 2025-04-28 DIAGNOSIS — E66.813 CLASS 3 DRUG-INDUCED OBESITY WITH SERIOUS COMORBIDITY AND BODY MASS INDEX (BMI) OF 50.0 TO 59.9 IN ADULT: ICD-10-CM

## 2025-04-28 NOTE — Clinical Note
Discussed GLP1 weight loss options during visit. Patient interested in using [facility] if approved by PCP. Please respond if compounding pharmacy is approved. Will update patient with plan.

## 2025-05-05 ENCOUNTER — NUTRITION (OUTPATIENT)
Dept: NUTRITION | Facility: HOSPITAL | Age: 30
End: 2025-05-05
Payer: COMMERCIAL

## 2025-05-05 VITALS — BODY MASS INDEX: 51.56 KG/M2 | HEIGHT: 74 IN

## 2025-05-05 DIAGNOSIS — E66.813 CLASS 3 DRUG-INDUCED OBESITY WITH SERIOUS COMORBIDITY AND BODY MASS INDEX (BMI) OF 50.0 TO 59.9 IN ADULT: ICD-10-CM

## 2025-05-05 DIAGNOSIS — E66.1 CLASS 3 DRUG-INDUCED OBESITY WITH SERIOUS COMORBIDITY AND BODY MASS INDEX (BMI) OF 50.0 TO 59.9 IN ADULT: ICD-10-CM

## 2025-05-05 PROCEDURE — 97802 MEDICAL NUTRITION INDIV IN: CPT | Performed by: DIETITIAN, REGISTERED

## 2025-05-05 NOTE — PATIENT INSTRUCTIONS
Follow the Healthy Plate Method at meals- see handout.  This will help to increase your vegetable intake and decrease portion sizes of carbohydrates.   - Check out [place] or [employer] for Mediterranean meal plans and recipes ideas.    When eating starchy foods, try to eat whole grains like potato with the skin, whole grain breads and cereals, brown rices and pastas.  Limit portions per the Healthy Plate Method.    Include protein with all meals and snacks.  Lean protein are better for cholesterol levels such as chicken(no skin), fish (baked or broiled or grilled), low-fat dairy, eggs, and peanut butter or nuts.    - For high protein snack ideas check out: https://www.[employer].com/article/182303/10-high-protein-snacks-that-keep-you-feeling-full-longer/  - Protein drinks between meals such as Premier Protein, Muscle Milk or Fairlife can help curb appetite.  4.   Aim to lose 1-2# per week to reach your goal weight.      - Consider tracking your calorie intake on an yvonne such as [employer] or [employer] to track your calories.  '   - Track macronutrients and aim for 30% of calories from protein, 40% of calories from carbohydrate, and 30% from fat.    - see sample meal plans for ideas.   5.   Increase fiber to 25-35g per day to help keep you roblero and lower cholesterol levels.  You may consider a fiber supplement such as Benefiber or Metamucil everyday.    6.   Aim to drink 64 oz of water daily  7.   Aim for 30 minutes of exercise on most days.

## 2025-05-05 NOTE — PROGRESS NOTES
"Nutrition Assessment     Reason for Visit:  Brandon Schmidt is a 30 y.o. male who presents for nutrition counseling seeking weight loss.      Anthropometrics:  Wt Readings from Last 10 Encounters:   04/07/25 (!) 182 kg (401 lb 9.6 oz)   04/04/25 (!) 159 kg (350 lb)     Anthropometrics  Height: 188 cm (6' 2\")  IBW/kg (Dietitian Calculated): 78.6 kg  Adjusted Body Weight (kg): 119 kg  Lab Results   Component Value Date    HGBA1C 6.2 (H) 04/04/2025     Food And Nutrient Intake:  Food and Nutrient History  Food and Nutrient History: Pt presents for nutrition counseling seeking weight loss.  BMI of 51 indicates morbid obesity.  Pt was recently hospitalized with A-fib.  He was prescribed to start on a semiglutide but has not yet started injections.  Pt works night shift and often receives meals while he is at work.  He generally eats 3 meals per day.  Pt is open to eating a variety of foods.  Suggested he try to follow a 2400 calorie diet to lose 2# per week.  He has a [employer] membership but has not started.  Provided pt with high protein snack ideas to have on hand at work.  He appears motivated lose make changes.   Food Intake  Meal 1: breakfast: noon- oatmeal and engilsh muffin with sausage ,egg and cheese  Meal 2: 7:00- dinner- meat; pasta, salad- get from work  Meal 3: 3:00am- lunch type meal  Snacks: granola bars, chips  Fluid Intake: sodas, Zero sugar beverages, sparkling water, water,  Pattern of Alcohol Consumption: none       Bioactive Substance Intake  Pattern of Alcohol Consumption: none       Physical Activities:  Physical Activity  Type of Physical Activity: [employer] membership    Nutrition Focused Physical Exam:  Deferred- no malnutrition suspected.      Energy Needs  Calculated Energy Needs Using Equations  Height: 188 cm (6' 2\")  Weight Used for Equation Calculations: 182 kg (401 lb)  Stephanie Cesar Equation (Overweight or Obese Patients): 2859  Activity Factor: 1.2  Total Energy Needs: " 3430  Estimated Energy Needs  Total Energy Estimated Needs in 24 hours (kCal): 2430 kCal  Method for Estimating Needs: MSJ x 1.2-1000 kcals  Estimated Protein Needs  Total Protein Estimated Needs in 24 Hours (g): 95 g  Method for Estimating 24 Hour Protein Needs: .8g kg adj BW    Nutrition Diagnosis   Malnutrition Diagnosis  Patient has Malnutrition Diagnosis: No  Nutrition Diagnosis  Patient has Nutrition Diagnosis: Yes  Diagnosis Status (1): New  Nutrition Diagnosis 1: Obesity class III  Related to (1): predicted excessive energy intake; physical inactivity  As Evidenced by (1): pt report; diet recall    Nutrition Interventions/Recommendations   Nutrition Prescription  Individualized Nutrition Prescription Provided for : Oral nutrition  Individualized Nutrition Prescription Provided for : 2400 kcals, 95g of protein, Healthy Plate model  Individualized Nutrition Prescription Provided for : 2400 kcals, 95g of protein, Healthy Plate model  Nutrition Education  Nutrition Education Content: Content related nutrition education, Education on nutrition's influence on health, Physical activity guidance  Goals: Instructed on counting macronutrient intake, proper portion sizes, and general healthful nutrition. Instructed on benefits of wt loss with diabetes and heart health. Discussed mindful eating, slow gradual wt loss and goals beyond wt. Instructed pt to not skip meals - discussed this may lead to over eating later or snacking more than planned. Instructed on importance of physical activity for wt loss and maintenance of wt loss. Both verbal and written education provided in the one-on-one setting. Pt verbalized understanding of information provided. All questions answered to pt satisfaction throughout visit    Patient Instructions   Follow the Healthy Plate Method at meals- see handout.  This will help to increase your vegetable intake and decrease portion sizes of carbohydrates.   - Check out Eatingwell.com or  [employer].org for Mediterranean meal plans and recipes ideas.    When eating starchy foods, try to eat whole grains like potato with the skin, whole grain breads and cereals, brown rices and pastas.  Limit portions per the Healthy Plate Method.    Include protein with all meals and snacks.  Lean protein are better for cholesterol levels such as chicken(no skin), fish (baked or broiled or grilled), low-fat dairy, eggs, and peanut butter or nuts.    - For high protein snack ideas check out: https://www.Mobshop.Stitch.es/article/962180/10-high-protein-snacks-that-keep-you-feeling-full-longer/  - Protein drinks between meals such as Premier Protein, Muscle Milk or Fairlife can help curb appetite.  4.   Aim to lose 1-2# per week to reach your goal weight.      - Consider tracking your calorie intake on an yvonne such as Dr[employer] [place] or Re-APP to track your calories.  '   - Track macronutrients and aim for 30% of calories from protein, 40% of calories from carbohydrate, and 30% from fat.    - see sample meal plans for ideas.   5.   Increase fiber to 25-35g per day to help keep you roblero and lower cholesterol levels.  You may consider a fiber supplement such as Benefiber or Metamucil everyday.    6.   Aim to drink 64 oz of water daily  7.   Aim for 30 minutes of exercise on most days.        Nutrition Monitoring and Evaluation   Food and Nutrient Intake  Monitoring and Evaluation Plan: Energy intake, Meal/snack pattern, Fiber intake, Carbohydrate intake, Amount of food, Fluid intake, Protein intake  Criteria: 2400 kcal/day  Criteria: aim for at least 64 oz of water daily  Criteria: Aim for 3 meals/day with 1-2 snack  Meal/Snack Pattern: Food variety, Estimated meal and snack pattern  Criteria: Follow plate method when planning meals (1/2 plate non-starchy vegetables, 1/4 plate lean protein, 1/4 plate carbohydrates).  Criteria: Choose lean protein sources including chicken, turkey, seafood, and eggs. Be sure to include protein  with each meal and snack  Criteria: Limit added sugar to less than 25 g per day  Criteria: Increase fiber from fruits, vegetables, whole grains, and beans/lentils  Additional Plan: Provided pt with the following handouts: wt loss tips, 2000 calorie 5 day meal plan, high protein snacks/ foods list, exercise, plate method  Knowledge Belief Attitude Determination   Monitoring and Evaluation Plan: Physical activity  Criteria: Encouraged regular physical activity including strength training as medically appropriate per AHA guidelines  Anthropometric measurements  Monitoring and Evaluation Plan: Weight change  Criteria: 1-2 lb wt loss per week  Biochemical Data, Medical Tests and Procedures  Monitoring and Evaluation Plan: Glucose/endocrine profile  Glucose/Endocrine Profile: Hemoglobin A1c (HgbA1c)  Criteria: wnl's.      Readiness to Change : Good  Level of Understanding : Good  Anticipated Compliant : Good

## 2025-05-14 ENCOUNTER — PATIENT OUTREACH (OUTPATIENT)
Dept: PRIMARY CARE | Facility: CLINIC | Age: 30
End: 2025-05-14
Payer: COMMERCIAL

## 2025-05-20 PROCEDURE — RXMED WILLOW AMBULATORY MEDICATION CHARGE

## 2025-05-21 ENCOUNTER — APPOINTMENT (OUTPATIENT)
Dept: CARDIOLOGY | Facility: CLINIC | Age: 30
End: 2025-05-21
Payer: COMMERCIAL

## 2025-05-21 VITALS
WEIGHT: 315 LBS | HEART RATE: 108 BPM | DIASTOLIC BLOOD PRESSURE: 60 MMHG | SYSTOLIC BLOOD PRESSURE: 104 MMHG | BODY MASS INDEX: 40.43 KG/M2 | HEIGHT: 74 IN

## 2025-05-21 DIAGNOSIS — R94.31 ABNORMAL EKG: ICD-10-CM

## 2025-05-21 DIAGNOSIS — Z78.9 NEVER SMOKED TOBACCO: ICD-10-CM

## 2025-05-21 DIAGNOSIS — Z01.818 PREOP TESTING: ICD-10-CM

## 2025-05-21 DIAGNOSIS — I48.91 ATRIAL FIBRILLATION WITH RAPID VENTRICULAR RESPONSE (MULTI): Primary | ICD-10-CM

## 2025-05-21 PROCEDURE — 3008F BODY MASS INDEX DOCD: CPT | Performed by: INTERNAL MEDICINE

## 2025-05-21 PROCEDURE — 99215 OFFICE O/P EST HI 40 MIN: CPT | Performed by: INTERNAL MEDICINE

## 2025-05-21 PROCEDURE — 3078F DIAST BP <80 MM HG: CPT | Performed by: INTERNAL MEDICINE

## 2025-05-21 PROCEDURE — 93000 ELECTROCARDIOGRAM COMPLETE: CPT | Performed by: INTERNAL MEDICINE

## 2025-05-21 PROCEDURE — 3074F SYST BP LT 130 MM HG: CPT | Performed by: INTERNAL MEDICINE

## 2025-05-21 PROCEDURE — 1036F TOBACCO NON-USER: CPT | Performed by: INTERNAL MEDICINE

## 2025-05-21 RX ORDER — SODIUM CHLORIDE 9 MG/ML
100 INJECTION, SOLUTION INTRAVENOUS CONTINUOUS
OUTPATIENT
Start: 2025-05-21 | End: 2025-05-22

## 2025-05-21 RX ORDER — CHLORHEXIDINE GLUCONATE 40 MG/ML
SOLUTION TOPICAL ONCE
OUTPATIENT
Start: 2025-05-21 | End: 2025-05-21

## 2025-05-21 RX ORDER — MUPIROCIN 20 MG/G
1 OINTMENT TOPICAL ONCE
OUTPATIENT
Start: 2025-05-21 | End: 2025-05-21

## 2025-05-21 ASSESSMENT — ENCOUNTER SYMPTOMS
DYSPNEA ON EXERTION: 0
PALPITATIONS: 0

## 2025-05-21 NOTE — PATIENT INSTRUCTIONS
Pre-Procedure Patient Information    You have been scheduled for: PVI ablation   At: [facility]  With: Dr. Harper  Date of procedure:     1. Please have transportation to and from the hospital. While you should plan for same-day discharge there is a possibility you will need to stay overnight.    2. You will receive a call from the hospital 24 - 72 hours before your procedure providing you with fasting instructions, procedure location detail, and time of arrival.  If you have not received a call from the hospital by 6 pm the day before your scheduled procedure, please call [phone].    3. Please bring a current list of medications with you to the hospital.      4. Medications to hold:        - If you are on Jardiance, Ozempic, Wgovy, Monjaro ect, please hold 7 days prior to procedure.        - Otherwise, you may continue your medications in the morning with sips of water.     5. Nothing to eat after midnight before the procedure. OK to take morning medications, with above exceptions, the day of the procedure with a small sip of water.     6. Please bring to our attention if you have any contrast, latex or metal allergies.    7. Please have your blood work as instructed completed at least a day before your procedure.    8. If you have any questions, please contact the office at [phone].      Continue same medications/treatment.  Patient educated on proper medication use.  Patient educated on risk factor modification.  Please bring any lab results from other providers/physicians to your next appointment.    Please bring all medicines, vitamins, and herbal supplements with you when you come to the office.    Prescriptions will not be filled unless you are compliant with your follow up appointments or have a follow up appointment scheduled as per instruction of your physician. Refills should be requested at the time of your visit.    SCHEDULE PVI ablation, CT, BRIANNA. Obtain labs 1 week prior to procedure. Orders  are in the system.   HOLD Semaglutide for 1 week prior to your procedure.     I, Katie COTA RN, AM SCRIBING FOR, AND IN THE PRESENCE OF DR. IDA CRAIN MD

## 2025-05-21 NOTE — PROGRESS NOTES
"CARDIOLOGY OFFICE VISIT      CHIEF COMPLAINT  Chief Complaint   Patient presents with    Hospital Follow-up     Patient is present for 4-6 week hospital follow up for Afib with RVR       HISTORY OF PRESENT ILLNESS  HPI  30-year-old male evaluated [facility] emergency room with complaints of palpitations that started at 3:00 this morning in April 4, 2025.  He has extensive cardiac history of atrial fibrillation with multiple cardioversions and PVI in the past.  Patient has been on metoprolol succinate 100 mg daily but off anticoagulation.  In the emergency room he was treated with metoprolol 5 mg IV x 2 with no response and subsequently underwent cardioversion which was successful.  Initial EKG showed atrial fibrillation heart rate 129 bpm and QTc 433 ms.  EKG post cardioversion showed Sinus rhythm with heart rate of 100 bpm and QTc 477 ms.  Laboratory values in the emergency room showed a potassium of 3.6, BUN of 0.81 GFR greater than 90, TSH of 4.54, free thyroxine of 0.78, WBCs of 14.1, and chest x-ray showing no evidence of acute cardiopulmonary process.  Troponin levels and BMPs negative, and magnesium level 1.93.  Patient was admitted to medicine service for further evaluation.  Patient has a past medical history of paroxysmal atrial fibrillation since 2019.  He states he has had multiple cardioversions with the last one in January 2024.  He states he has maintained a regular rhythm until last night, patient is usually symptomatic with palpitations.  In 2022 he underwent a PVI at [facility] in [facility].  He had been treated with amiodarone and anticoagulation with Xarelto in the past and is currently on metoprolol.  He had a \"negative\" nuclear stress test in the past either in 2022 /2023.  He was initially thought to have a nonischemic cardiomyopathy with a EF 35%, that then improved.  Patient also states he had a cardiac MRI in the past which was " negative for infiltrative disease.  His father has a history of atrial fibrillation as well as his paternal grandfather.    Echocardiogram April 4, 2025    CONCLUSIONS:   1. The left ventricular systolic function is normal, with a visually estimated ejection fraction of 55%.   2. Mild concentric LVH.   3. There is normal right ventricular global systolic function.   4. The left atrial size is moderately dilated.   5. Right ventricular systolic pressure is within normal limits.   6. Normal valve structure and function.    Since the discharge from the hospital he has been doing well.  Denies any symptoms of chest pain or shortness of breath or palpitations.    EKG performed today shows sinus bradycardia rate of 58 bpm QRS 98 ms QT corrected 447 ms.  Rhythm strip shows the same pattern.          Past Medical History  Medical History[1]    Social History  Social History[2]    Family History   Family History[3]     Allergies:  RX Allergies[4]     Outpatient Medications:  Current Outpatient Medications   Medication Instructions    Eliquis 5 mg, oral, 2 times daily    lisinopriL-hydrochlorothiazide 20-25 mg tablet 1 tablet, oral, Daily    metoprolol succinate XL (Toprol-XL) 100 mg 24 hr tablet Take 1 tablet (100 mg) by mouth once daily.    semaglutide 0.25 mg, subcutaneous, Weekly          REVIEW OF SYSTEMS  Review of Systems   Cardiovascular:  Negative for chest pain, dyspnea on exertion and palpitations.   All other systems reviewed and are negative.        VITALS  Vitals:    05/21/25 1326   BP: 104/60   Pulse: 108       PHYSICAL EXAM  Constitutional:       General: Awake.      Appearance: Normal and healthy appearance. Well-developed and not in distress.   Neck:      Vascular: No JVR. JVD normal.   Pulmonary:      Effort: Pulmonary effort is normal.      Breath sounds: Normal breath sounds. No wheezing. No rhonchi. No rales.   Chest:      Chest wall: Not tender to palpatation.   Cardiovascular:      PMI at left  "midclavicular line. Tachycardia present. Irregularly irregular rhythm. Normal S1. Normal S2.       Murmurs: There is no murmur.      No gallop.  No click. No rub.   Pulses:     Intact distal pulses.   Edema:     Peripheral edema absent.   Abdominal:      Tenderness: There is no abdominal tenderness.   Musculoskeletal: Normal range of motion.         General: No tenderness. Skin:     General: Skin is warm and dry.   Neurological:      General: No focal deficit present.      Mental Status: Alert and oriented to person, place and time.           ASSESSMENT AND PLAN  Clinical impression:  1.  Paroxysmal atrial fibrillation currently on metoprolol, not on anticoagulation.  Treated in the emergency room with metoprolol IV x 2 doses and then cardioversion  2.  PVI and 2022 and multiple cardioversions in the past treated with amiodarone and Xarelto at [facility] in [facility]  3.  \"Negative \"nuclear stress test 2022/2023  4.  History of nonischemic cardiomyopathy and LVEF 35%, since improved  5.  Obstructive sleep apnea unable to tolerate CPAP  6.  Morbid obesity  7.  Hypertension     Plan-recommendations    I had a lengthy discussion with patient regarding management for atrial fibrillation.  Patient should continue metoprolol    Continue Eliquis.    Patient is agreeable with a redo pulmonary isolation to prevent atrial fibrillation.  Procedure, risk, benefits and possible complications were explained to patient.  All questions were answered.  Patient agrees with plan.  Informed consent was signed.    Do not stop Eliquis for the procedure.    Risk factor modification and lifestyle modification discussed with patient. Diet , exercise and hydration discussed with patient.    I have personally review with patient during this office visit, laboratory data, echocardiogram results, stress test results, Holter-event monitor results prior and after the last electrophysiology visit. All questions has " been answered.    Please excuse any errors in grammar or translation related to this dictation.  Voice recognition software was utilized to prepare this document.      I, Dr. Harper, personally performed the services described in the documentation as scribed by the nurse in my presence, and confirm it is both accurate and complete.            [1]   Past Medical History:  Diagnosis Date    Atrial fibrillation (Multi)     Heart disease 2020    Sleep apnea    [2]   Social History  Tobacco Use    Smoking status: Never    Smokeless tobacco: Never   Vaping Use    Vaping status: Never Used   Substance Use Topics    Alcohol use: Not Currently     Comment: Only on special occasions.    Drug use: Never   [3]   Family History  Problem Relation Name Age of Onset    Heart disease Father Vick Schmidt     Atrial fibrillation Father Vick Schmidt     Heart disease Maternal Grandfather Arcadio Villarreal     Atrial fibrillation Maternal Grandfather Arcadio Villarreal     Diabetes Mother's Sister Maricel Xiang     Diabetes Mother's Sister Maricel Xiang    [4] No Known Allergies

## 2025-05-22 ENCOUNTER — PHARMACY VISIT (OUTPATIENT)
Dept: PHARMACY | Facility: CLINIC | Age: 30
End: 2025-05-22
Payer: COMMERCIAL

## 2025-05-27 NOTE — PROGRESS NOTES
Pharmacist Clinic: Weight Loss     Brandon Schmidt is a 30 y.o. male who presents for evaluation of GLPra medications for weight loss.     Referring Provider: Ganesh Young MD    Subjective    Patient is currently using GLP1ra as a therapeutic option for weight management. Patient is a candidate for weight loss management based on BMI >30. Patient has not tried medication management for weight loss in the past. He endorses attempting lifestyle modifications such as diet/exercise. Patient has no familial hx of MEN2/MTC, or pancreatitis. Last A1c (6.2%) done WNL.    Patient following with registered dietitian for diet modifications.    Objective   Wt Readings from Last 3 Encounters:   05/21/25 (!) 181 kg (400 lb)   04/07/25 (!) 182 kg (401 lb 9.6 oz)   04/04/25 (!) 159 kg (350 lb)      There is no height or weight on file to calculate BMI.     Wegovy Education:  Counseled patient on Wegovy MOA, expectations, side effects, duration of therapy, administration, and monitoring parameters.  Counseled patient on the benefits of GLP-1ra, such as cardiovascular risk reduction, glycemic control, and weight loss potential.  Provided detailed dosing and administration counseling to ensure proper technique.   Reviewed storage requirements of Wegovy when not in use, and when to administer the medication if a dose is missed.  Discussed risks of GLP1ra including risk of pancreatitis, MTC and worsening of DR  Advised patient that they may experience improved satiety after meals and portion sizes of meals may be reduced as doses of Wegovy increase.      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 Patient's  current weight reported as 401 lb. Has lost 0 lbs (0% of TBW) since starting therapy plan. Patient denies any side effects. Due to tolerating start of compounded semaglutide, plan to increase semaglutide 0.3 mg to 0.6 mg once weekly.    Medication Changes:  INCREASE  Semaglutide 0.3 mg to 0.6 mg once weekly    Future Considerations:  Titrate to maximum tolerated dose for weight loss benefit    Monitoring and Education:  Counseled patient on relevant medication mechanisms of action, expectations, side effects, duration of therapy, contraindications, administration, and monitoring parameters.  All questions and concerns addressed. Contact pharmacist with any further questions or concerns prior to next appointment.  Reviewed dietary recommendations: Healthy Plate method     Follow up: 6/18/2025 @ 1040    Carlito Stiles, PharmD    Continue all meds under the continuation of care with the referring provider and clinical pharmacy team.

## 2025-05-28 ENCOUNTER — APPOINTMENT (OUTPATIENT)
Dept: PHARMACY | Facility: HOSPITAL | Age: 30
End: 2025-05-28
Payer: COMMERCIAL

## 2025-05-28 DIAGNOSIS — E66.1 CLASS 3 DRUG-INDUCED OBESITY WITH SERIOUS COMORBIDITY AND BODY MASS INDEX (BMI) OF 50.0 TO 59.9 IN ADULT: ICD-10-CM

## 2025-05-28 DIAGNOSIS — E66.813 CLASS 3 DRUG-INDUCED OBESITY WITH SERIOUS COMORBIDITY AND BODY MASS INDEX (BMI) OF 50.0 TO 59.9 IN ADULT: ICD-10-CM

## 2025-06-13 ENCOUNTER — APPOINTMENT (OUTPATIENT)
Dept: RADIOLOGY | Facility: CLINIC | Age: 30
End: 2025-06-13
Payer: COMMERCIAL

## 2025-06-18 ENCOUNTER — APPOINTMENT (OUTPATIENT)
Dept: PHARMACY | Facility: HOSPITAL | Age: 30
End: 2025-06-18
Payer: COMMERCIAL

## 2025-06-19 ENCOUNTER — LAB (OUTPATIENT)
Dept: LAB | Facility: HOSPITAL | Age: 30
End: 2025-06-19
Payer: COMMERCIAL

## 2025-06-19 LAB
ANION GAP SERPL CALC-SCNC: 13 MMOL/L (ref 10–20)
BUN SERPL-MCNC: 16 MG/DL (ref 6–23)
CALCIUM SERPL-MCNC: 9.4 MG/DL (ref 8.6–10.3)
CHLORIDE SERPL-SCNC: 97 MMOL/L (ref 98–107)
CO2 SERPL-SCNC: 28 MMOL/L (ref 21–32)
CREAT SERPL-MCNC: 0.75 MG/DL (ref 0.5–1.3)
EGFRCR SERPLBLD CKD-EPI 2021: >90 ML/MIN/1.73M*2
ERYTHROCYTE [DISTWIDTH] IN BLOOD BY AUTOMATED COUNT: 13.8 % (ref 11.5–14.5)
GLUCOSE SERPL-MCNC: 112 MG/DL (ref 74–99)
HCT VFR BLD AUTO: 45 % (ref 41–52)
HGB BLD-MCNC: 14.7 G/DL (ref 13.5–17.5)
INR PPP: 1.1 (ref 0.9–1.1)
MCH RBC QN AUTO: 28.7 PG (ref 26–34)
MCHC RBC AUTO-ENTMCNC: 32.7 G/DL (ref 32–36)
MCV RBC AUTO: 88 FL (ref 80–100)
NRBC BLD-RTO: 0 /100 WBCS (ref 0–0)
PLATELET # BLD AUTO: 269 X10*3/UL (ref 150–450)
POTASSIUM SERPL-SCNC: 4.2 MMOL/L (ref 3.5–5.3)
PROTHROMBIN TIME: 12.3 SECONDS (ref 9.8–12.4)
RBC # BLD AUTO: 5.13 X10*6/UL (ref 4.5–5.9)
SODIUM SERPL-SCNC: 134 MMOL/L (ref 136–145)
WBC # BLD AUTO: 14 X10*3/UL (ref 4.4–11.3)

## 2025-06-19 PROCEDURE — 80048 BASIC METABOLIC PNL TOTAL CA: CPT

## 2025-06-19 PROCEDURE — 85027 COMPLETE CBC AUTOMATED: CPT

## 2025-06-19 PROCEDURE — 85610 PROTHROMBIN TIME: CPT

## 2025-06-24 ENCOUNTER — APPOINTMENT (OUTPATIENT)
Dept: RADIOLOGY | Facility: CLINIC | Age: 30
End: 2025-06-24
Payer: COMMERCIAL

## 2025-06-24 DIAGNOSIS — I48.91 ATRIAL FIBRILLATION WITH RAPID VENTRICULAR RESPONSE (MULTI): ICD-10-CM

## 2025-06-24 PROCEDURE — 2550000001 HC RX 255 CONTRASTS: Performed by: INTERNAL MEDICINE

## 2025-06-24 PROCEDURE — 71275 CT ANGIOGRAPHY CHEST: CPT

## 2025-06-24 RX ADMIN — IOHEXOL 90 ML: 350 INJECTION, SOLUTION INTRAVENOUS at 11:28

## 2025-06-27 ENCOUNTER — PATIENT OUTREACH (OUTPATIENT)
Dept: CARDIOLOGY | Facility: HOSPITAL | Age: 30
End: 2025-06-27
Payer: COMMERCIAL

## 2025-07-03 ENCOUNTER — PATIENT MESSAGE (OUTPATIENT)
Dept: CARDIOLOGY | Facility: CLINIC | Age: 30
End: 2025-07-03
Payer: COMMERCIAL

## 2025-07-03 DIAGNOSIS — I48.91 ATRIAL FIBRILLATION WITH RAPID VENTRICULAR RESPONSE (MULTI): ICD-10-CM

## 2025-07-04 RX ORDER — METOPROLOL SUCCINATE 100 MG/1
100 TABLET, EXTENDED RELEASE ORAL DAILY
Qty: 90 TABLET | Refills: 3 | Status: SHIPPED | OUTPATIENT
Start: 2025-07-04 | End: 2026-07-04

## 2025-07-07 ENCOUNTER — APPOINTMENT (OUTPATIENT)
Dept: NUTRITION | Facility: HOSPITAL | Age: 30
End: 2025-07-07
Payer: COMMERCIAL

## 2025-07-09 ENCOUNTER — HOSPITAL ENCOUNTER (OUTPATIENT)
Dept: CARDIOLOGY | Facility: HOSPITAL | Age: 30
Discharge: HOME | End: 2025-07-09
Payer: COMMERCIAL

## 2025-07-09 ENCOUNTER — APPOINTMENT (OUTPATIENT)
Dept: PHARMACY | Facility: HOSPITAL | Age: 30
End: 2025-07-09
Payer: COMMERCIAL

## 2025-07-09 DIAGNOSIS — Z01.810 ENCOUNTER FOR PREPROCEDURAL CARDIOVASCULAR EXAMINATION: ICD-10-CM

## 2025-07-09 DIAGNOSIS — I48.91 ATRIAL FIBRILLATION WITH RAPID VENTRICULAR RESPONSE (MULTI): ICD-10-CM

## 2025-07-09 LAB
ABO GROUP (TYPE) IN BLOOD: NORMAL
ALBUMIN SERPL BCP-MCNC: 3.8 G/DL (ref 3.4–5)
ALP SERPL-CCNC: 63 U/L (ref 33–120)
ALT SERPL W P-5'-P-CCNC: 51 U/L (ref 10–52)
ANION GAP SERPL CALC-SCNC: 13 MMOL/L (ref 10–20)
APTT PPP: 33 SECONDS (ref 26–36)
AST SERPL W P-5'-P-CCNC: 22 U/L (ref 9–39)
BILIRUB SERPL-MCNC: 0.6 MG/DL (ref 0–1.2)
BUN SERPL-MCNC: 22 MG/DL (ref 6–23)
CALCIUM SERPL-MCNC: 9 MG/DL (ref 8.6–10.3)
CHLORIDE SERPL-SCNC: 100 MMOL/L (ref 98–107)
CO2 SERPL-SCNC: 25 MMOL/L (ref 21–32)
CREAT SERPL-MCNC: 0.91 MG/DL (ref 0.5–1.3)
EGFRCR SERPLBLD CKD-EPI 2021: >90 ML/MIN/1.73M*2
ERYTHROCYTE [DISTWIDTH] IN BLOOD BY AUTOMATED COUNT: 14.4 % (ref 11.5–14.5)
GLUCOSE SERPL-MCNC: 134 MG/DL (ref 74–99)
HCT VFR BLD AUTO: 42.9 % (ref 41–52)
HGB BLD-MCNC: 14.4 G/DL (ref 13.5–17.5)
INR PPP: 1.1 (ref 0.9–1.1)
MCH RBC QN AUTO: 28.9 PG (ref 26–34)
MCHC RBC AUTO-ENTMCNC: 33.6 G/DL (ref 32–36)
MCV RBC AUTO: 86 FL (ref 80–100)
NRBC BLD-RTO: 0 /100 WBCS (ref 0–0)
PLATELET # BLD AUTO: 284 X10*3/UL (ref 150–450)
POTASSIUM SERPL-SCNC: 4 MMOL/L (ref 3.5–5.3)
PROT SERPL-MCNC: 7.4 G/DL (ref 6.4–8.2)
PROTHROMBIN TIME: 12.7 SECONDS (ref 9.8–12.4)
Q ONSET: 221 MS
QRS COUNT: 29 BEATS
QRS DURATION: 84 MS
QT INTERVAL: 260 MS
QTC CALCULATION(BAZETT): 448 MS
QTC FREDERICIA: 374 MS
R AXIS: 61 DEGREES
RBC # BLD AUTO: 4.99 X10*6/UL (ref 4.5–5.9)
RH FACTOR (ANTIGEN D): NORMAL
SODIUM SERPL-SCNC: 134 MMOL/L (ref 136–145)
T AXIS: 3 DEGREES
T OFFSET: 351 MS
VENTRICULAR RATE: 179 BPM
WBC # BLD AUTO: 15.2 X10*3/UL (ref 4.4–11.3)

## 2025-07-09 PROCEDURE — 7100000002 HC RECOVERY ROOM TIME - EACH INCREMENTAL 1 MINUTE: Performed by: INTERNAL MEDICINE

## 2025-07-09 PROCEDURE — 7100000010 HC PHASE TWO TIME - EACH INCREMENTAL 1 MINUTE: Performed by: INTERNAL MEDICINE

## 2025-07-09 PROCEDURE — 85027 COMPLETE CBC AUTOMATED: CPT | Performed by: NURSE PRACTITIONER

## 2025-07-09 PROCEDURE — 7100000001 HC RECOVERY ROOM TIME - INITIAL BASE CHARGE: Performed by: INTERNAL MEDICINE

## 2025-07-09 PROCEDURE — 84075 ASSAY ALKALINE PHOSPHATASE: CPT | Performed by: NURSE PRACTITIONER

## 2025-07-09 PROCEDURE — 99152 MOD SED SAME PHYS/QHP 5/>YRS: CPT | Performed by: INTERNAL MEDICINE

## 2025-07-09 PROCEDURE — 85610 PROTHROMBIN TIME: CPT | Performed by: NURSE PRACTITIONER

## 2025-07-09 PROCEDURE — 99222 1ST HOSP IP/OBS MODERATE 55: CPT | Performed by: NURSE PRACTITIONER

## 2025-07-09 PROCEDURE — 7100000009 HC PHASE TWO TIME - INITIAL BASE CHARGE: Performed by: INTERNAL MEDICINE

## 2025-07-09 PROCEDURE — 2500000005 HC RX 250 GENERAL PHARMACY W/O HCPCS: Performed by: INTERNAL MEDICINE

## 2025-07-09 PROCEDURE — 93319 3D ECHO IMG CGEN CAR ANOMAL: CPT

## 2025-07-09 PROCEDURE — 93005 ELECTROCARDIOGRAM TRACING: CPT

## 2025-07-09 PROCEDURE — 2500000004 HC RX 250 GENERAL PHARMACY W/ HCPCS (ALT 636 FOR OP/ED): Performed by: INTERNAL MEDICINE

## 2025-07-09 PROCEDURE — 36415 COLL VENOUS BLD VENIPUNCTURE: CPT | Performed by: NURSE PRACTITIONER

## 2025-07-09 RX ORDER — SODIUM CHLORIDE 9 MG/ML
10 INJECTION, SOLUTION INTRAVENOUS CONTINUOUS
Status: DISCONTINUED | OUTPATIENT
Start: 2025-07-09 | End: 2025-07-10 | Stop reason: HOSPADM

## 2025-07-09 RX ORDER — MIDAZOLAM HYDROCHLORIDE 1 MG/ML
INJECTION, SOLUTION INTRAMUSCULAR; INTRAVENOUS AS NEEDED
Status: DISCONTINUED | OUTPATIENT
Start: 2025-07-09 | End: 2025-07-09 | Stop reason: HOSPADM

## 2025-07-09 RX ORDER — LIDOCAINE HYDROCHLORIDE 20 MG/ML
JELLY TOPICAL AS NEEDED
Status: DISCONTINUED | OUTPATIENT
Start: 2025-07-09 | End: 2025-07-09 | Stop reason: HOSPADM

## 2025-07-09 RX ORDER — FENTANYL CITRATE 50 UG/ML
INJECTION, SOLUTION INTRAMUSCULAR; INTRAVENOUS AS NEEDED
Status: DISCONTINUED | OUTPATIENT
Start: 2025-07-09 | End: 2025-07-09 | Stop reason: HOSPADM

## 2025-07-09 RX ADMIN — BENZOCAINE, BUTAMBEN, AND TETRACAINE HYDROCHLORIDE 2 SPRAY: .028; .004; .004 AEROSOL, SPRAY TOPICAL at 12:09

## 2025-07-09 RX ADMIN — MIDAZOLAM 2 MG: 1 INJECTION INTRAMUSCULAR; INTRAVENOUS at 12:20

## 2025-07-09 RX ADMIN — MIDAZOLAM 2 MG: 1 INJECTION INTRAMUSCULAR; INTRAVENOUS at 12:23

## 2025-07-09 RX ADMIN — LIDOCAINE HYDROCHLORIDE 1 APPLICATION: 20 JELLY TOPICAL at 12:10

## 2025-07-09 RX ADMIN — FENTANYL CITRATE 50 MCG: 50 INJECTION, SOLUTION INTRAMUSCULAR; INTRAVENOUS at 12:20

## 2025-07-09 ASSESSMENT — PAIN SCALES - GENERAL
PAINLEVEL_OUTOF10: 0 - NO PAIN

## 2025-07-09 ASSESSMENT — ENCOUNTER SYMPTOMS
CONSTITUTIONAL NEGATIVE: 1
PALPITATIONS: 1
NEUROLOGICAL NEGATIVE: 1
HEMATOLOGIC/LYMPHATIC NEGATIVE: 1
PSYCHIATRIC NEGATIVE: 1
ENDOCRINE NEGATIVE: 1
EYES NEGATIVE: 1
MUSCULOSKELETAL NEGATIVE: 1
RESPIRATORY NEGATIVE: 1
ALLERGIC/IMMUNOLOGIC NEGATIVE: 1
GASTROINTESTINAL NEGATIVE: 1

## 2025-07-09 ASSESSMENT — COLUMBIA-SUICIDE SEVERITY RATING SCALE - C-SSRS
2. HAVE YOU ACTUALLY HAD ANY THOUGHTS OF KILLING YOURSELF?: NO
1. IN THE PAST MONTH, HAVE YOU WISHED YOU WERE DEAD OR WISHED YOU COULD GO TO SLEEP AND NOT WAKE UP?: NO
6. HAVE YOU EVER DONE ANYTHING, STARTED TO DO ANYTHING, OR PREPARED TO DO ANYTHING TO END YOUR LIFE?: NO

## 2025-07-09 ASSESSMENT — PAIN - FUNCTIONAL ASSESSMENT: PAIN_FUNCTIONAL_ASSESSMENT: 0-10

## 2025-07-09 NOTE — H&P (VIEW-ONLY)
"History Of Present Illness  Brandon Schmidt is a 30 y.o. male presenting with history of paroxysmal atrial fibrillation.  Office note dated 5/21/2025 from Dr. Harper reviewed.  Patient with history of paroxysmal atrial fibrillation since 2019 with history of multiple cardioversions.  He underwent PVI in 2022 in [facility].  He was evaluated by Dr. Harper at [facility] in April 2025 for palpitations and atrial fibrillation and underwent cardioversion at that time.  Dr. Harper recommended redo pulmonary vein isolation for management of atrial fibrillation at office visit 5/21/2025 patient presents to [facility] today for transesophageal echocardiogram prior to scheduled PVI.     Patient states that he went back into atrial fibrillation \"a couple of days ago.\"  I personally reviewed EKG that was done on arrival to preop area showed A-fib with RVR with ventricular rate 179 bpm.  Patient states that this is not uncommon with activity.  He took his beta-blocker approximately 1 hour prior to arrival.  He continues on Eliquis.  Is following with pharmacy, nutritionist for weight loss attempts and started on semaglutide but has not taken in over 1 week.    Past medical history  1.  Paroxysmal atrial fibrillation currently on metoprolol, not on anticoagulation.  Treated in the emergency room with metoprolol IV x 2 doses and then cardioversion  2.  PVI and 2022 and multiple cardioversions in the past treated with amiodarone and Xarelto at [facility] in [facility]  3.  \"Negative \"nuclear stress test 2022/2023  4.  History of nonischemic cardiomyopathy and LVEF 35%, since improved  5.  Obstructive sleep apnea unable to tolerate CPAP  6.  Morbid obesity  7.  Hypertension    Cardiac testing:  Echocardiogram April 4, 2025  CONCLUSIONS:   1. The left ventricular systolic function is normal, with a visually estimated ejection fraction of 55%.   2. Mild concentric LVH.   3. " There is normal right ventricular global systolic function.   4. The left atrial size is moderately dilated.   5. Right ventricular systolic pressure is within normal limits.   6. Normal valve structure and function.   Past Medical History  Medical History[1]    Surgical History  Surgical History[2]     Social History  He reports that he has never smoked. He has never used smokeless tobacco. He reports that he does not currently use alcohol. He reports that he does not use drugs.    Family History  Family History[3]     Allergies  Patient has no known allergies.    Review of Systems   Constitutional: Negative.    HENT: Negative.     Eyes: Negative.    Respiratory: Negative.     Cardiovascular:  Positive for palpitations. Negative for chest pain and leg swelling.   Gastrointestinal: Negative.    Endocrine: Negative.    Genitourinary: Negative.    Musculoskeletal: Negative.    Allergic/Immunologic: Negative.    Neurological: Negative.    Hematological: Negative.    Psychiatric/Behavioral: Negative.          Physical Exam  Constitutional:       General: He is not in acute distress.     Appearance: He is not ill-appearing.      Comments: Morbidly obese male, pleasant and cooperative   HENT:      Head: Normocephalic.      Mouth/Throat:      Mouth: Mucous membranes are moist.   Eyes:      Pupils: Pupils are equal, round, and reactive to light.   Cardiovascular:      Rate and Rhythm: Tachycardia present. Rhythm irregular.      Heart sounds: No murmur heard.  Pulmonary:      Effort: Pulmonary effort is normal.      Breath sounds: Normal breath sounds.   Abdominal:      General: Bowel sounds are normal.      Palpations: Abdomen is soft.   Musculoskeletal:         General: Normal range of motion.      Cervical back: Normal range of motion.   Skin:     General: Skin is warm and dry.   Neurological:      Mental Status: He is oriented to person, place, and time.   Psychiatric:         Mood and Affect: Mood normal.          Behavior: Behavior normal.          Last Recorded Vitals  There were no vitals taken for this visit.    Relevant Results        Results for orders placed or performed during the hospital encounter of 07/09/25 (from the past 96 hours)   ECG 12 lead   Result Value Ref Range    Ventricular Rate 179 BPM    QRS Duration 84 ms    QT Interval 260 ms    QTC Calculation(Bazett) 448 ms    R Axis 61 degrees    T Axis 3 degrees    QRS Count 29 beats    Q Onset 221 ms    T Offset 351 ms    QTC Fredericia 374 ms      Labs pending at time of dictation      Paroxysmal atrial fibrillation currently on metoprolol and anticoagulated with Eliquis-BRIANNA scheduled with Dr. Borges prior to planned PVI with Dr. Harper.  Reviewed BRIANNA procedure and postprocedure activity restrictions.  His questions were answered.  Continue to monitor heart rate as patient took his oral beta-blocker prior to arrival.    I spent 40 minutes in the professional and overall care of this patient.      Marcy Rob, APRN-CNP         [1]   Past Medical History:  Diagnosis Date    Arrhythmia     Atrial fibrillation (Multi)     Heart disease 2020    Hypertension     Sleep apnea    [2]   Past Surgical History:  Procedure Laterality Date    CARDIAC CATHETERIZATION  2022   [3]   Family History  Problem Relation Name Age of Onset    Heart disease Father Vick PAYNE'Donnell     Atrial fibrillation Father Vick PAYNE'Donnell     Heart disease Maternal Grandfather Arcadio Villarreal     Atrial fibrillation Maternal Grandfather Arcadio Villarreal     Diabetes Mother's Sister Maricel Otoole     Diabetes Mother's Sister Maricel Otoole

## 2025-07-09 NOTE — SIGNIFICANT EVENT
Swallow eval complete, pt has gag reflex, able to sip water without difficulty, box lunch provided

## 2025-07-09 NOTE — DISCHARGE INSTRUCTIONS
**Please return to [facility] tomorrow, 7/10/2025 at 6:00 AM for your atrial fibrillation ablation procedure under the care of Dr. Harper.  Nothing to eat or drink after midnight tonight in preparation for your procedure.  You may take your medications as previously instructed with small sips of water.  Please continue to hold your semaglutide as instructed.          TRANSESOPHAGEAL ECHOCARDIOGRAM DISCHARGE INSTRUCTIONS    FOR SUDDEN AND SEVERE CHEST PAIN, SHORTNESS OF BREATH, SIGNS OF STROKE OR CHANGES IN MENTAL STATUS YOU SHOULD CALL 911 IMMEDIATELY.    FOR NEXT 24 HOURS  - Upon discharge, you should return home and rest for the remainder of the day and evening. You do not have to stay on bed rest but should not be very active.  It is recommended a responsible adult be with you for the first 24 hours after the procedure.    - No driving for 24 hours after procedure.  Please arrange for someone to drive you home from the hospital today.  No driving until your follow-up appointment with your provider if you have had a passing out spell in the recent past or previously restricted from driving.     - Do not drive, operate machinery, or use power tools for 24 hours after your procedure.     - Do not make any legal decisions for 24 hours after your procedure.     - Do not drink alcoholic beverages for 24 hours after your procedure.

## 2025-07-09 NOTE — H&P
"History Of Present Illness  Brandon Schmidt is a 30 y.o. male presenting with history of paroxysmal atrial fibrillation.  Office note dated 5/21/2025 from Dr. Harper reviewed.  Patient with history of paroxysmal atrial fibrillation since 2019 with history of multiple cardioversions.  He underwent PVI in 2022 in [facility].  He was evaluated by Dr. Harper at [facility] in April 2025 for palpitations and atrial fibrillation and underwent cardioversion at that time.  Dr. Harper recommended redo pulmonary vein isolation for management of atrial fibrillation at office visit 5/21/2025 patient presents to [facility] today for transesophageal echocardiogram prior to scheduled PVI.     Patient states that he went back into atrial fibrillation \"a couple of days ago.\"  I personally reviewed EKG that was done on arrival to preop area showed A-fib with RVR with ventricular rate 179 bpm.  Patient states that this is not uncommon with activity.  He took his beta-blocker approximately 1 hour prior to arrival.  He continues on Eliquis.  Is following with pharmacy, nutritionist for weight loss attempts and started on semaglutide but has not taken in over 1 week.    Past medical history  1.  Paroxysmal atrial fibrillation currently on metoprolol, not on anticoagulation.  Treated in the emergency room with metoprolol IV x 2 doses and then cardioversion  2.  PVI and 2022 and multiple cardioversions in the past treated with amiodarone and Xarelto at [facility] in [facility]  3.  \"Negative \"nuclear stress test 2022/2023  4.  History of nonischemic cardiomyopathy and LVEF 35%, since improved  5.  Obstructive sleep apnea unable to tolerate CPAP  6.  Morbid obesity  7.  Hypertension    Cardiac testing:  Echocardiogram April 4, 2025  CONCLUSIONS:   1. The left ventricular systolic function is normal, with a visually estimated ejection fraction of 55%.   2. Mild concentric LVH.   3. " There is normal right ventricular global systolic function.   4. The left atrial size is moderately dilated.   5. Right ventricular systolic pressure is within normal limits.   6. Normal valve structure and function.   Past Medical History  Medical History[1]    Surgical History  Surgical History[2]     Social History  He reports that he has never smoked. He has never used smokeless tobacco. He reports that he does not currently use alcohol. He reports that he does not use drugs.    Family History  Family History[3]     Allergies  Patient has no known allergies.    Review of Systems   Constitutional: Negative.    HENT: Negative.     Eyes: Negative.    Respiratory: Negative.     Cardiovascular:  Positive for palpitations. Negative for chest pain and leg swelling.   Gastrointestinal: Negative.    Endocrine: Negative.    Genitourinary: Negative.    Musculoskeletal: Negative.    Allergic/Immunologic: Negative.    Neurological: Negative.    Hematological: Negative.    Psychiatric/Behavioral: Negative.          Physical Exam  Constitutional:       General: He is not in acute distress.     Appearance: He is not ill-appearing.      Comments: Morbidly obese male, pleasant and cooperative   HENT:      Head: Normocephalic.      Mouth/Throat:      Mouth: Mucous membranes are moist.   Eyes:      Pupils: Pupils are equal, round, and reactive to light.   Cardiovascular:      Rate and Rhythm: Tachycardia present. Rhythm irregular.      Heart sounds: No murmur heard.  Pulmonary:      Effort: Pulmonary effort is normal.      Breath sounds: Normal breath sounds.   Abdominal:      General: Bowel sounds are normal.      Palpations: Abdomen is soft.   Musculoskeletal:         General: Normal range of motion.      Cervical back: Normal range of motion.   Skin:     General: Skin is warm and dry.   Neurological:      Mental Status: He is oriented to person, place, and time.   Psychiatric:         Mood and Affect: Mood normal.          Behavior: Behavior normal.          Last Recorded Vitals  There were no vitals taken for this visit.    Relevant Results        Results for orders placed or performed during the hospital encounter of 07/09/25 (from the past 96 hours)   ECG 12 lead   Result Value Ref Range    Ventricular Rate 179 BPM    QRS Duration 84 ms    QT Interval 260 ms    QTC Calculation(Bazett) 448 ms    R Axis 61 degrees    T Axis 3 degrees    QRS Count 29 beats    Q Onset 221 ms    T Offset 351 ms    QTC Fredericia 374 ms      Labs pending at time of dictation      Paroxysmal atrial fibrillation currently on metoprolol and anticoagulated with Eliquis-BRIANNA scheduled with Dr. Borges prior to planned PVI with Dr. Harper.  Reviewed BRIANNA procedure and postprocedure activity restrictions.  His questions were answered.  Continue to monitor heart rate as patient took his oral beta-blocker prior to arrival.    I spent 40 minutes in the professional and overall care of this patient.      Marcy Rob, APRN-CNP         [1]   Past Medical History:  Diagnosis Date    Arrhythmia     Atrial fibrillation (Multi)     Heart disease 2020    Hypertension     Sleep apnea    [2]   Past Surgical History:  Procedure Laterality Date    CARDIAC CATHETERIZATION  2022   [3]   Family History  Problem Relation Name Age of Onset    Heart disease Father Vick PAYNE'Donnell     Atrial fibrillation Father Vick PAYNE'Donnell     Heart disease Maternal Grandfather Arcadio Villarreal     Atrial fibrillation Maternal Grandfather Arcadio Villarreal     Diabetes Mother's Sister Maricel Otoole     Diabetes Mother's Sister Maricel Otoole

## 2025-07-09 NOTE — SIGNIFICANT EVENT
Discharge instructions reviewed with pt including pre op info for ablation tomorrow, verbalized understanding, escorted to main entrance for discharge

## 2025-07-10 ENCOUNTER — ANESTHESIA EVENT (OUTPATIENT)
Dept: CARDIOLOGY | Facility: HOSPITAL | Age: 30
End: 2025-07-10
Payer: COMMERCIAL

## 2025-07-10 ENCOUNTER — APPOINTMENT (OUTPATIENT)
Dept: CARDIOLOGY | Facility: HOSPITAL | Age: 30
End: 2025-07-10
Payer: COMMERCIAL

## 2025-07-10 ENCOUNTER — ANESTHESIA (OUTPATIENT)
Dept: CARDIOLOGY | Facility: HOSPITAL | Age: 30
End: 2025-07-10
Payer: COMMERCIAL

## 2025-07-10 ENCOUNTER — HOSPITAL ENCOUNTER (OUTPATIENT)
Facility: HOSPITAL | Age: 30
Discharge: HOME | End: 2025-07-11
Attending: INTERNAL MEDICINE | Admitting: INTERNAL MEDICINE
Payer: COMMERCIAL

## 2025-07-10 VITALS
HEART RATE: 90 BPM | SYSTOLIC BLOOD PRESSURE: 140 MMHG | HEIGHT: 74 IN | OXYGEN SATURATION: 96 % | WEIGHT: 315 LBS | DIASTOLIC BLOOD PRESSURE: 108 MMHG | RESPIRATION RATE: 20 BRPM | BODY MASS INDEX: 40.43 KG/M2 | TEMPERATURE: 98.2 F

## 2025-07-10 DIAGNOSIS — I48.91 ATRIAL FIBRILLATION WITH RAPID VENTRICULAR RESPONSE (MULTI): Primary | ICD-10-CM

## 2025-07-10 DIAGNOSIS — Z98.890 H/O CARDIAC RADIOFREQUENCY ABLATION: ICD-10-CM

## 2025-07-10 DIAGNOSIS — I31.39 OTHER PERICARDIAL EFFUSION (NONINFLAMMATORY) (HHS-HCC): ICD-10-CM

## 2025-07-10 PROBLEM — I48.19 ATRIAL FIBRILLATION, PERSISTENT (MULTI): Status: ACTIVE | Noted: 2025-07-10

## 2025-07-10 LAB
ABO GROUP (TYPE) IN BLOOD: NORMAL
ACT BLD: 134 SEC (ref 83–199)
ACT BLD: 213 SEC (ref 83–199)
ACT BLD: 247 SEC (ref 83–199)
ACT BLD: 248 SEC (ref 83–199)
ACT BLD: 253 SEC (ref 83–199)
ACT BLD: 273 SEC (ref 83–199)
ACT BLD: 334 SEC (ref 83–199)
ACT BLD: 343 SEC (ref 83–199)
ACT BLD: 343 SEC (ref 83–199)
ACT BLD: >397 SEC (ref 83–199)
ACT BLD: >397 SEC (ref 83–199)
ANTIBODY SCREEN: NORMAL
ATRIAL RATE: 102 BPM
EJECTION FRACTION: 53 %
P AXIS: 73 DEGREES
P OFFSET: 179 MS
P ONSET: 130 MS
PR INTERVAL: 172 MS
Q ONSET: 216 MS
Q ONSET: 216 MS
QRS COUNT: 17 BEATS
QRS COUNT: 29 BEATS
QRS DURATION: 106 MS
QRS DURATION: 90 MS
QT INTERVAL: 276 MS
QT INTERVAL: 374 MS
QTC CALCULATION(BAZETT): 472 MS
QTC CALCULATION(BAZETT): 487 MS
QTC FREDERICIA: 395 MS
QTC FREDERICIA: 446 MS
R AXIS: 40 DEGREES
R AXIS: 40 DEGREES
RH FACTOR (ANTIGEN D): NORMAL
T AXIS: -13 DEGREES
T AXIS: -3 DEGREES
T OFFSET: 354 MS
T OFFSET: 403 MS
VENTRICULAR RATE: 102 BPM
VENTRICULAR RATE: 176 BPM

## 2025-07-10 PROCEDURE — 85347 COAGULATION TIME ACTIVATED: CPT | Performed by: INTERNAL MEDICINE

## 2025-07-10 PROCEDURE — 3700000002 HC GENERAL ANESTHESIA TIME - EACH INCREMENTAL 1 MINUTE: Performed by: INTERNAL MEDICINE

## 2025-07-10 PROCEDURE — 93005 ELECTROCARDIOGRAM TRACING: CPT | Mod: 59

## 2025-07-10 PROCEDURE — 7100000001 HC RECOVERY ROOM TIME - INITIAL BASE CHARGE: Performed by: INTERNAL MEDICINE

## 2025-07-10 PROCEDURE — 2500000004 HC RX 250 GENERAL PHARMACY W/ HCPCS (ALT 636 FOR OP/ED): Performed by: NURSE PRACTITIONER

## 2025-07-10 PROCEDURE — 7100000002 HC RECOVERY ROOM TIME - EACH INCREMENTAL 1 MINUTE: Performed by: INTERNAL MEDICINE

## 2025-07-10 PROCEDURE — 93656 COMPRE EP EVAL ABLTJ ATR FIB: CPT | Performed by: INTERNAL MEDICINE

## 2025-07-10 PROCEDURE — 2500000005 HC RX 250 GENERAL PHARMACY W/O HCPCS: Performed by: NURSE PRACTITIONER

## 2025-07-10 PROCEDURE — 2500000005 HC RX 250 GENERAL PHARMACY W/O HCPCS: Performed by: NURSE ANESTHETIST, CERTIFIED REGISTERED

## 2025-07-10 PROCEDURE — 93657 TX L/R ATRIAL FIB ADDL: CPT | Performed by: INTERNAL MEDICINE

## 2025-07-10 PROCEDURE — C1759 CATH, INTRA ECHOCARDIOGRAPHY: HCPCS | Performed by: INTERNAL MEDICINE

## 2025-07-10 PROCEDURE — 3700000001 HC GENERAL ANESTHESIA TIME - INITIAL BASE CHARGE: Performed by: INTERNAL MEDICINE

## 2025-07-10 PROCEDURE — 2500000004 HC RX 250 GENERAL PHARMACY W/ HCPCS (ALT 636 FOR OP/ED): Performed by: NURSE ANESTHETIST, CERTIFIED REGISTERED

## 2025-07-10 PROCEDURE — C1732 CATH, EP, DIAG/ABL, 3D/VECT: HCPCS | Performed by: INTERNAL MEDICINE

## 2025-07-10 PROCEDURE — 7100000011 HC EXTENDED STAY RECOVERY HOURLY - NURSING UNIT

## 2025-07-10 PROCEDURE — 2500000004 HC RX 250 GENERAL PHARMACY W/ HCPCS (ALT 636 FOR OP/ED): Performed by: INTERNAL MEDICINE

## 2025-07-10 PROCEDURE — 86900 BLOOD TYPING SEROLOGIC ABO: CPT | Performed by: NURSE PRACTITIONER

## 2025-07-10 PROCEDURE — 2500000001 HC RX 250 WO HCPCS SELF ADMINISTERED DRUGS (ALT 637 FOR MEDICARE OP): Performed by: ANESTHESIOLOGY

## 2025-07-10 PROCEDURE — 93308 TTE F-UP OR LMTD: CPT | Performed by: INTERNAL MEDICINE

## 2025-07-10 PROCEDURE — 2780000003 HC OR 278 NO HCPCS: Performed by: INTERNAL MEDICINE

## 2025-07-10 PROCEDURE — 2500000001 HC RX 250 WO HCPCS SELF ADMINISTERED DRUGS (ALT 637 FOR MEDICARE OP): Performed by: NURSE PRACTITIONER

## 2025-07-10 PROCEDURE — 2720000007 HC OR 272 NO HCPCS: Performed by: INTERNAL MEDICINE

## 2025-07-10 PROCEDURE — C1760 CLOSURE DEV, VASC: HCPCS | Performed by: INTERNAL MEDICINE

## 2025-07-10 PROCEDURE — 36415 COLL VENOUS BLD VENIPUNCTURE: CPT | Performed by: NURSE PRACTITIONER

## 2025-07-10 PROCEDURE — 2500000004 HC RX 250 GENERAL PHARMACY W/ HCPCS (ALT 636 FOR OP/ED): Mod: JZ | Performed by: ANESTHESIOLOGY

## 2025-07-10 PROCEDURE — 92960 CARDIOVERSION ELECTRIC EXT: CPT | Mod: 59 | Performed by: INTERNAL MEDICINE

## 2025-07-10 PROCEDURE — 7100000010 HC PHASE TWO TIME - EACH INCREMENTAL 1 MINUTE: Performed by: INTERNAL MEDICINE

## 2025-07-10 PROCEDURE — G0269 OCCLUSIVE DEVICE IN VEIN ART: HCPCS | Performed by: INTERNAL MEDICINE

## 2025-07-10 PROCEDURE — C1893 INTRO/SHEATH, FIXED,NON-PEEL: HCPCS | Performed by: INTERNAL MEDICINE

## 2025-07-10 PROCEDURE — 85347 COAGULATION TIME ACTIVATED: CPT

## 2025-07-10 PROCEDURE — 7100000009 HC PHASE TWO TIME - INITIAL BASE CHARGE: Performed by: INTERNAL MEDICINE

## 2025-07-10 PROCEDURE — C1731 CATH, EP, 20 OR MORE ELEC: HCPCS | Performed by: INTERNAL MEDICINE

## 2025-07-10 PROCEDURE — C1769 GUIDE WIRE: HCPCS | Performed by: INTERNAL MEDICINE

## 2025-07-10 PROCEDURE — 93010 ELECTROCARDIOGRAM REPORT: CPT | Performed by: INTERNAL MEDICINE

## 2025-07-10 PROCEDURE — 2700000047 HC OR 270 NO HCPCS: Performed by: INTERNAL MEDICINE

## 2025-07-10 PROCEDURE — 93308 TTE F-UP OR LMTD: CPT

## 2025-07-10 RX ORDER — LIDOCAINE HYDROCHLORIDE 10 MG/ML
INJECTION, SOLUTION EPIDURAL; INFILTRATION; INTRACAUDAL; PERINEURAL AS NEEDED
Status: DISCONTINUED | OUTPATIENT
Start: 2025-07-10 | End: 2025-07-10 | Stop reason: HOSPADM

## 2025-07-10 RX ORDER — FENTANYL CITRATE 50 UG/ML
INJECTION, SOLUTION INTRAMUSCULAR; INTRAVENOUS AS NEEDED
Status: DISCONTINUED | OUTPATIENT
Start: 2025-07-10 | End: 2025-07-10

## 2025-07-10 RX ORDER — PHENYLEPHRINE 10 MG/250 ML(40 MCG/ML)IN 0.9 % SOD.CHLORIDE INTRAVENOUS
CONTINUOUS PRN
Status: DISCONTINUED | OUTPATIENT
Start: 2025-07-10 | End: 2025-07-10

## 2025-07-10 RX ORDER — MEPERIDINE HYDROCHLORIDE 25 MG/ML
12.5 INJECTION INTRAMUSCULAR; INTRAVENOUS; SUBCUTANEOUS EVERY 10 MIN PRN
Status: DISCONTINUED | OUTPATIENT
Start: 2025-07-10 | End: 2025-07-10

## 2025-07-10 RX ORDER — METOPROLOL TARTRATE 1 MG/ML
2.5 INJECTION, SOLUTION INTRAVENOUS EVERY 6 HOURS PRN
Status: DISCONTINUED | OUTPATIENT
Start: 2025-07-10 | End: 2025-07-10

## 2025-07-10 RX ORDER — KETOROLAC TROMETHAMINE 30 MG/ML
30 INJECTION, SOLUTION INTRAMUSCULAR; INTRAVENOUS ONCE
Status: COMPLETED | OUTPATIENT
Start: 2025-07-10 | End: 2025-07-10

## 2025-07-10 RX ORDER — LIDOCAINE HYDROCHLORIDE 20 MG/ML
INJECTION, SOLUTION INFILTRATION; PERINEURAL AS NEEDED
Status: DISCONTINUED | OUTPATIENT
Start: 2025-07-10 | End: 2025-07-10

## 2025-07-10 RX ORDER — FAMOTIDINE 10 MG/ML
INJECTION INTRAVENOUS AS NEEDED
Status: DISCONTINUED | OUTPATIENT
Start: 2025-07-10 | End: 2025-07-10

## 2025-07-10 RX ORDER — METOPROLOL TARTRATE 1 MG/ML
5 INJECTION, SOLUTION INTRAVENOUS EVERY 4 HOURS PRN
Status: DISCONTINUED | OUTPATIENT
Start: 2025-07-10 | End: 2025-07-11 | Stop reason: HOSPADM

## 2025-07-10 RX ORDER — METOPROLOL TARTRATE 50 MG/1
50 TABLET ORAL 2 TIMES DAILY
Status: DISCONTINUED | OUTPATIENT
Start: 2025-07-10 | End: 2025-07-11 | Stop reason: HOSPADM

## 2025-07-10 RX ORDER — ONDANSETRON HYDROCHLORIDE 2 MG/ML
4 INJECTION, SOLUTION INTRAVENOUS EVERY 8 HOURS PRN
Status: DISCONTINUED | OUTPATIENT
Start: 2025-07-10 | End: 2025-07-11 | Stop reason: HOSPADM

## 2025-07-10 RX ORDER — ACETAMINOPHEN 325 MG/1
650 TABLET ORAL EVERY 4 HOURS PRN
Status: DISCONTINUED | OUTPATIENT
Start: 2025-07-10 | End: 2025-07-10

## 2025-07-10 RX ORDER — PROTAMINE SULFATE 10 MG/ML
INJECTION, SOLUTION INTRAVENOUS AS NEEDED
Status: DISCONTINUED | OUTPATIENT
Start: 2025-07-10 | End: 2025-07-10

## 2025-07-10 RX ORDER — SODIUM CHLORIDE 9 MG/ML
20 INJECTION, SOLUTION INTRAVENOUS CONTINUOUS
Status: DISCONTINUED | OUTPATIENT
Start: 2025-07-10 | End: 2025-07-10

## 2025-07-10 RX ORDER — ONDANSETRON HYDROCHLORIDE 2 MG/ML
INJECTION, SOLUTION INTRAVENOUS AS NEEDED
Status: DISCONTINUED | OUTPATIENT
Start: 2025-07-10 | End: 2025-07-10

## 2025-07-10 RX ORDER — OXYCODONE HYDROCHLORIDE 5 MG/1
5 TABLET ORAL EVERY 4 HOURS PRN
Status: DISCONTINUED | OUTPATIENT
Start: 2025-07-10 | End: 2025-07-10

## 2025-07-10 RX ORDER — SODIUM CHLORIDE, SODIUM LACTATE, POTASSIUM CHLORIDE, CALCIUM CHLORIDE 600; 310; 30; 20 MG/100ML; MG/100ML; MG/100ML; MG/100ML
100 INJECTION, SOLUTION INTRAVENOUS CONTINUOUS
Status: DISCONTINUED | OUTPATIENT
Start: 2025-07-10 | End: 2025-07-10

## 2025-07-10 RX ORDER — METOPROLOL SUCCINATE 50 MG/1
100 TABLET, EXTENDED RELEASE ORAL DAILY
Status: DISCONTINUED | OUTPATIENT
Start: 2025-07-11 | End: 2025-07-10

## 2025-07-10 RX ORDER — OXYCODONE HYDROCHLORIDE 5 MG/1
10 TABLET ORAL EVERY 4 HOURS PRN
Status: DISCONTINUED | OUTPATIENT
Start: 2025-07-10 | End: 2025-07-10

## 2025-07-10 RX ORDER — DIPHENHYDRAMINE HYDROCHLORIDE 50 MG/ML
INJECTION, SOLUTION INTRAMUSCULAR; INTRAVENOUS AS NEEDED
Status: DISCONTINUED | OUTPATIENT
Start: 2025-07-10 | End: 2025-07-10

## 2025-07-10 RX ORDER — ONDANSETRON 4 MG/1
4 TABLET, FILM COATED ORAL EVERY 8 HOURS PRN
Status: DISCONTINUED | OUTPATIENT
Start: 2025-07-10 | End: 2025-07-11 | Stop reason: HOSPADM

## 2025-07-10 RX ORDER — FUROSEMIDE 10 MG/ML
INJECTION INTRAMUSCULAR; INTRAVENOUS AS NEEDED
Status: DISCONTINUED | OUTPATIENT
Start: 2025-07-10 | End: 2025-07-10

## 2025-07-10 RX ORDER — TRAMADOL HYDROCHLORIDE 50 MG/1
50 TABLET, FILM COATED ORAL EVERY 6 HOURS PRN
Status: DISCONTINUED | OUTPATIENT
Start: 2025-07-10 | End: 2025-07-11 | Stop reason: HOSPADM

## 2025-07-10 RX ORDER — PROPOFOL 10 MG/ML
INJECTION, EMULSION INTRAVENOUS AS NEEDED
Status: DISCONTINUED | OUTPATIENT
Start: 2025-07-10 | End: 2025-07-10

## 2025-07-10 RX ORDER — SODIUM CHLORIDE, SODIUM LACTATE, POTASSIUM CHLORIDE, CALCIUM CHLORIDE 600; 310; 30; 20 MG/100ML; MG/100ML; MG/100ML; MG/100ML
INJECTION, SOLUTION INTRAVENOUS CONTINUOUS PRN
Status: DISCONTINUED | OUTPATIENT
Start: 2025-07-10 | End: 2025-07-10

## 2025-07-10 RX ORDER — SUCCINYLCHOLINE CHLORIDE 100 MG/5ML
SYRINGE (ML) INTRAVENOUS AS NEEDED
Status: DISCONTINUED | OUTPATIENT
Start: 2025-07-10 | End: 2025-07-10

## 2025-07-10 RX ORDER — LIDOCAINE HYDROCHLORIDE 10 MG/ML
0.1 INJECTION, SOLUTION INFILTRATION; PERINEURAL ONCE
Status: DISCONTINUED | OUTPATIENT
Start: 2025-07-10 | End: 2025-07-10

## 2025-07-10 RX ORDER — ACETAMINOPHEN 325 MG/1
650 TABLET ORAL EVERY 4 HOURS PRN
Status: DISCONTINUED | OUTPATIENT
Start: 2025-07-10 | End: 2025-07-11 | Stop reason: HOSPADM

## 2025-07-10 RX ORDER — DIPHENHYDRAMINE HYDROCHLORIDE 50 MG/ML
12.5 INJECTION, SOLUTION INTRAMUSCULAR; INTRAVENOUS ONCE AS NEEDED
Status: DISCONTINUED | OUTPATIENT
Start: 2025-07-10 | End: 2025-07-10

## 2025-07-10 RX ORDER — NORETHINDRONE AND ETHINYL ESTRADIOL 0.5-0.035
KIT ORAL AS NEEDED
Status: DISCONTINUED | OUTPATIENT
Start: 2025-07-10 | End: 2025-07-10

## 2025-07-10 RX ORDER — ALBUTEROL SULFATE 0.83 MG/ML
2.5 SOLUTION RESPIRATORY (INHALATION) ONCE AS NEEDED
Status: DISCONTINUED | OUTPATIENT
Start: 2025-07-10 | End: 2025-07-10

## 2025-07-10 RX ORDER — CHLORHEXIDINE GLUCONATE 40 MG/ML
SOLUTION TOPICAL ONCE
Status: COMPLETED | OUTPATIENT
Start: 2025-07-10 | End: 2025-07-10

## 2025-07-10 RX ORDER — ONDANSETRON HYDROCHLORIDE 2 MG/ML
4 INJECTION, SOLUTION INTRAVENOUS ONCE AS NEEDED
Status: DISCONTINUED | OUTPATIENT
Start: 2025-07-10 | End: 2025-07-10

## 2025-07-10 RX ORDER — FENTANYL CITRATE 50 UG/ML
25 INJECTION, SOLUTION INTRAMUSCULAR; INTRAVENOUS EVERY 5 MIN PRN
Status: DISCONTINUED | OUTPATIENT
Start: 2025-07-10 | End: 2025-07-10

## 2025-07-10 RX ORDER — PHENYLEPHRINE HCL IN 0.9% NACL 1 MG/10 ML
SYRINGE (ML) INTRAVENOUS AS NEEDED
Status: DISCONTINUED | OUTPATIENT
Start: 2025-07-10 | End: 2025-07-10

## 2025-07-10 RX ORDER — HYDROMORPHONE HYDROCHLORIDE 1 MG/ML
0.5 INJECTION, SOLUTION INTRAMUSCULAR; INTRAVENOUS; SUBCUTANEOUS EVERY 5 MIN PRN
Status: DISCONTINUED | OUTPATIENT
Start: 2025-07-10 | End: 2025-07-10

## 2025-07-10 RX ORDER — PANTOPRAZOLE SODIUM 40 MG/1
40 TABLET, DELAYED RELEASE ORAL
Status: DISCONTINUED | OUTPATIENT
Start: 2025-07-11 | End: 2025-07-11 | Stop reason: HOSPADM

## 2025-07-10 RX ORDER — HEPARIN SODIUM 1000 [USP'U]/ML
INJECTION, SOLUTION INTRAVENOUS; SUBCUTANEOUS AS NEEDED
Status: DISCONTINUED | OUTPATIENT
Start: 2025-07-10 | End: 2025-07-10

## 2025-07-10 RX ORDER — ROCURONIUM BROMIDE 10 MG/ML
INJECTION, SOLUTION INTRAVENOUS AS NEEDED
Status: DISCONTINUED | OUTPATIENT
Start: 2025-07-10 | End: 2025-07-10

## 2025-07-10 RX ORDER — MIDAZOLAM HYDROCHLORIDE 1 MG/ML
INJECTION, SOLUTION INTRAMUSCULAR; INTRAVENOUS AS NEEDED
Status: DISCONTINUED | OUTPATIENT
Start: 2025-07-10 | End: 2025-07-10

## 2025-07-10 RX ADMIN — METOPROLOL TARTRATE 2.5 MG: 5 INJECTION INTRAVENOUS at 16:26

## 2025-07-10 RX ADMIN — MIDAZOLAM 2 MG: 1 INJECTION INTRAMUSCULAR; INTRAVENOUS at 08:48

## 2025-07-10 RX ADMIN — Medication 200 MCG: at 08:59

## 2025-07-10 RX ADMIN — Medication 300 MCG: at 09:05

## 2025-07-10 RX ADMIN — Medication 200 MCG: at 09:08

## 2025-07-10 RX ADMIN — EPHEDRINE SULFATE 10 MG: 50 INJECTION, SOLUTION INTRAVENOUS at 12:25

## 2025-07-10 RX ADMIN — DIPHENHYDRAMINE HYDROCHLORIDE 25 MG: 50 INJECTION INTRAMUSCULAR; INTRAVENOUS at 09:08

## 2025-07-10 RX ADMIN — EPHEDRINE SULFATE 10 MG: 50 INJECTION, SOLUTION INTRAVENOUS at 12:07

## 2025-07-10 RX ADMIN — FUROSEMIDE 40 MG: 10 INJECTION, SOLUTION INTRAMUSCULAR; INTRAVENOUS at 12:43

## 2025-07-10 RX ADMIN — Medication 400 MCG: at 12:44

## 2025-07-10 RX ADMIN — SUGAMMADEX 200 MG: 100 INJECTION, SOLUTION INTRAVENOUS at 12:44

## 2025-07-10 RX ADMIN — Medication 200 MCG: at 09:02

## 2025-07-10 RX ADMIN — HYDROMORPHONE HYDROCHLORIDE 0.5 MG: 1 INJECTION, SOLUTION INTRAMUSCULAR; INTRAVENOUS; SUBCUTANEOUS at 13:50

## 2025-07-10 RX ADMIN — Medication 400 MCG: at 10:51

## 2025-07-10 RX ADMIN — METOPROLOL TARTRATE 2.5 MG: 5 INJECTION INTRAVENOUS at 22:58

## 2025-07-10 RX ADMIN — PHENYLEPHRINE 10 MG/250 ML(40 MCG/ML)IN 0.9 % SOD.CHLORIDE INTRAVENOUS 0.5 MCG/KG/MIN: at 09:09

## 2025-07-10 RX ADMIN — HEPARIN SODIUM 3000 UNITS: 1000 INJECTION INTRAVENOUS; SUBCUTANEOUS at 11:49

## 2025-07-10 RX ADMIN — PROTAMINE SULFATE 50 MG: 10 INJECTION, SOLUTION INTRAVENOUS at 12:37

## 2025-07-10 RX ADMIN — SODIUM CHLORIDE, POTASSIUM CHLORIDE, SODIUM LACTATE AND CALCIUM CHLORIDE: 600; 310; 30; 20 INJECTION, SOLUTION INTRAVENOUS at 10:51

## 2025-07-10 RX ADMIN — SODIUM CHLORIDE, POTASSIUM CHLORIDE, SODIUM LACTATE AND CALCIUM CHLORIDE: 600; 310; 30; 20 INJECTION, SOLUTION INTRAVENOUS at 08:29

## 2025-07-10 RX ADMIN — Medication: at 06:31

## 2025-07-10 RX ADMIN — SODIUM CHLORIDE 20 ML/HR: 9 INJECTION, SOLUTION INTRAVENOUS at 06:31

## 2025-07-10 RX ADMIN — Medication 200 MCG: at 09:47

## 2025-07-10 RX ADMIN — KETOROLAC TROMETHAMINE 30 MG: 30 INJECTION, SOLUTION INTRAMUSCULAR at 15:33

## 2025-07-10 RX ADMIN — HEPARIN SODIUM 7000 UNITS: 1000 INJECTION INTRAVENOUS; SUBCUTANEOUS at 09:46

## 2025-07-10 RX ADMIN — Medication 400 MCG: at 11:17

## 2025-07-10 RX ADMIN — ONDANSETRON 4 MG: 2 INJECTION, SOLUTION INTRAMUSCULAR; INTRAVENOUS at 09:12

## 2025-07-10 RX ADMIN — HEPARIN SODIUM 10000 UNITS: 1000 INJECTION INTRAVENOUS; SUBCUTANEOUS at 09:37

## 2025-07-10 RX ADMIN — EPHEDRINE SULFATE 10 MG: 50 INJECTION, SOLUTION INTRAVENOUS at 12:10

## 2025-07-10 RX ADMIN — PROPOFOL 100 MG: 10 INJECTION, EMULSION INTRAVENOUS at 08:48

## 2025-07-10 RX ADMIN — EPHEDRINE SULFATE 20 MG: 50 INJECTION, SOLUTION INTRAVENOUS at 12:31

## 2025-07-10 RX ADMIN — FAMOTIDINE 20 MG: 10 INJECTION, SOLUTION INTRAVENOUS at 09:12

## 2025-07-10 RX ADMIN — LIDOCAINE HYDROCHLORIDE 100 MG: 20 INJECTION, SOLUTION INFILTRATION; PERINEURAL at 08:42

## 2025-07-10 RX ADMIN — DEXAMETHASONE SODIUM PHOSPHATE 8 MG: 4 INJECTION, SOLUTION INTRAMUSCULAR; INTRAVENOUS at 09:08

## 2025-07-10 RX ADMIN — ROCURONIUM BROMIDE 50 MG: 50 INJECTION, SOLUTION INTRAVENOUS at 08:52

## 2025-07-10 RX ADMIN — HEPARIN SODIUM 5000 UNITS: 1000 INJECTION INTRAVENOUS; SUBCUTANEOUS at 10:09

## 2025-07-10 RX ADMIN — Medication 400 MCG: at 09:34

## 2025-07-10 RX ADMIN — METOPROLOL TARTRATE 50 MG: 50 TABLET, FILM COATED ORAL at 17:45

## 2025-07-10 RX ADMIN — Medication 300 MCG: at 11:10

## 2025-07-10 RX ADMIN — PROPOFOL 100 MG: 10 INJECTION, EMULSION INTRAVENOUS at 08:42

## 2025-07-10 RX ADMIN — ROCURONIUM BROMIDE 50 MG: 50 INJECTION, SOLUTION INTRAVENOUS at 09:20

## 2025-07-10 RX ADMIN — ROCURONIUM BROMIDE 50 MG: 50 INJECTION, SOLUTION INTRAVENOUS at 11:02

## 2025-07-10 RX ADMIN — MIDAZOLAM 6 MG: 1 INJECTION INTRAMUSCULAR; INTRAVENOUS at 12:39

## 2025-07-10 RX ADMIN — FENTANYL CITRATE 50 MCG: 50 INJECTION, SOLUTION INTRAMUSCULAR; INTRAVENOUS at 11:01

## 2025-07-10 RX ADMIN — HEPARIN SODIUM 10000 UNITS: 1000 INJECTION INTRAVENOUS; SUBCUTANEOUS at 10:54

## 2025-07-10 RX ADMIN — FENTANYL CITRATE 50 MCG: 50 INJECTION, SOLUTION INTRAMUSCULAR; INTRAVENOUS at 09:59

## 2025-07-10 RX ADMIN — Medication 300 MCG: at 10:47

## 2025-07-10 RX ADMIN — Medication 100 MCG: at 08:56

## 2025-07-10 RX ADMIN — OXYCODONE HYDROCHLORIDE 10 MG: 5 TABLET ORAL at 14:22

## 2025-07-10 RX ADMIN — HEPARIN SODIUM 5000 UNITS: 1000 INJECTION INTRAVENOUS; SUBCUTANEOUS at 11:11

## 2025-07-10 RX ADMIN — Medication 400 MCG: at 09:20

## 2025-07-10 RX ADMIN — APIXABAN 5 MG: 5 TABLET, FILM COATED ORAL at 20:07

## 2025-07-10 RX ADMIN — HEPARIN SODIUM 7000 UNITS: 1000 INJECTION INTRAVENOUS; SUBCUTANEOUS at 10:29

## 2025-07-10 RX ADMIN — HEPARIN SODIUM 5000 UNITS: 1000 INJECTION INTRAVENOUS; SUBCUTANEOUS at 11:32

## 2025-07-10 RX ADMIN — FENTANYL CITRATE 100 MCG: 50 INJECTION, SOLUTION INTRAMUSCULAR; INTRAVENOUS at 08:48

## 2025-07-10 RX ADMIN — Medication 100 MG: at 08:48

## 2025-07-10 RX ADMIN — FENTANYL CITRATE 50 MCG: 50 INJECTION, SOLUTION INTRAMUSCULAR; INTRAVENOUS at 10:54

## 2025-07-10 SDOH — SOCIAL STABILITY: SOCIAL INSECURITY
WITHIN THE LAST YEAR, HAVE YOU BEEN RAPED OR FORCED TO HAVE ANY KIND OF SEXUAL ACTIVITY BY YOUR PARTNER OR EX-PARTNER?: NO

## 2025-07-10 SDOH — SOCIAL STABILITY: SOCIAL INSECURITY: WITHIN THE LAST YEAR, HAVE YOU BEEN AFRAID OF YOUR PARTNER OR EX-PARTNER?: NO

## 2025-07-10 SDOH — ECONOMIC STABILITY: FOOD INSECURITY: WITHIN THE PAST 12 MONTHS, YOU WORRIED THAT YOUR FOOD WOULD RUN OUT BEFORE YOU GOT THE MONEY TO BUY MORE.: NEVER TRUE

## 2025-07-10 SDOH — SOCIAL STABILITY: SOCIAL INSECURITY: DO YOU FEEL ANYONE HAS EXPLOITED OR TAKEN ADVANTAGE OF YOU FINANCIALLY OR OF YOUR PERSONAL PROPERTY?: NO

## 2025-07-10 SDOH — SOCIAL STABILITY: SOCIAL INSECURITY: DOES ANYONE TRY TO KEEP YOU FROM HAVING/CONTACTING OTHER FRIENDS OR DOING THINGS OUTSIDE YOUR HOME?: NO

## 2025-07-10 SDOH — HEALTH STABILITY: MENTAL HEALTH: CURRENT SMOKER: 0

## 2025-07-10 SDOH — SOCIAL STABILITY: SOCIAL INSECURITY: WITHIN THE LAST YEAR, HAVE YOU BEEN HUMILIATED OR EMOTIONALLY ABUSED IN OTHER WAYS BY YOUR PARTNER OR EX-PARTNER?: NO

## 2025-07-10 SDOH — SOCIAL STABILITY: SOCIAL INSECURITY: ARE THERE ANY APPARENT SIGNS OF INJURIES/BEHAVIORS THAT COULD BE RELATED TO ABUSE/NEGLECT?: NO

## 2025-07-10 SDOH — ECONOMIC STABILITY: FOOD INSECURITY: WITHIN THE PAST 12 MONTHS, THE FOOD YOU BOUGHT JUST DIDN'T LAST AND YOU DIDN'T HAVE MONEY TO GET MORE.: NEVER TRUE

## 2025-07-10 SDOH — SOCIAL STABILITY: SOCIAL INSECURITY: HAS ANYONE EVER THREATENED TO HURT YOUR FAMILY OR YOUR PETS?: NO

## 2025-07-10 SDOH — SOCIAL STABILITY: SOCIAL INSECURITY
WITHIN THE LAST YEAR, HAVE YOU BEEN KICKED, HIT, SLAPPED, OR OTHERWISE PHYSICALLY HURT BY YOUR PARTNER OR EX-PARTNER?: NO

## 2025-07-10 SDOH — SOCIAL STABILITY: SOCIAL INSECURITY: ABUSE: ADULT

## 2025-07-10 SDOH — ECONOMIC STABILITY: INCOME INSECURITY: IN THE PAST 12 MONTHS HAS THE ELECTRIC, GAS, OIL, OR WATER COMPANY THREATENED TO SHUT OFF SERVICES IN YOUR HOME?: NO

## 2025-07-10 SDOH — SOCIAL STABILITY: SOCIAL INSECURITY: WERE YOU ABLE TO COMPLETE ALL THE BEHAVIORAL HEALTH SCREENINGS?: YES

## 2025-07-10 SDOH — SOCIAL STABILITY: SOCIAL INSECURITY: ARE YOU OR HAVE YOU BEEN THREATENED OR ABUSED PHYSICALLY, EMOTIONALLY, OR SEXUALLY BY ANYONE?: NO

## 2025-07-10 SDOH — SOCIAL STABILITY: SOCIAL INSECURITY: DO YOU FEEL UNSAFE GOING BACK TO THE PLACE WHERE YOU ARE LIVING?: NO

## 2025-07-10 SDOH — SOCIAL STABILITY: SOCIAL INSECURITY: HAVE YOU HAD THOUGHTS OF HARMING ANYONE ELSE?: NO

## 2025-07-10 ASSESSMENT — COGNITIVE AND FUNCTIONAL STATUS - GENERAL
DAILY ACTIVITIY SCORE: 24
DAILY ACTIVITIY SCORE: 24
MOBILITY SCORE: 24
MOBILITY SCORE: 24
PATIENT BASELINE BEDBOUND: NO

## 2025-07-10 ASSESSMENT — PAIN - FUNCTIONAL ASSESSMENT
PAIN_FUNCTIONAL_ASSESSMENT: 0-10
PAIN_FUNCTIONAL_ASSESSMENT: WONG-BAKER FACES
PAIN_FUNCTIONAL_ASSESSMENT: 0-10

## 2025-07-10 ASSESSMENT — ACTIVITIES OF DAILY LIVING (ADL)
HEARING - RIGHT EAR: FUNCTIONAL
WALKS IN HOME: INDEPENDENT
GROOMING: INDEPENDENT
TOILETING: INDEPENDENT
BATHING: INDEPENDENT
PATIENT'S MEMORY ADEQUATE TO SAFELY COMPLETE DAILY ACTIVITIES?: YES
LACK_OF_TRANSPORTATION: NO
ADEQUATE_TO_COMPLETE_ADL: YES
FEEDING YOURSELF: INDEPENDENT
JUDGMENT_ADEQUATE_SAFELY_COMPLETE_DAILY_ACTIVITIES: YES
DRESSING YOURSELF: INDEPENDENT
HEARING - LEFT EAR: FUNCTIONAL

## 2025-07-10 ASSESSMENT — PATIENT HEALTH QUESTIONNAIRE - PHQ9
SUM OF ALL RESPONSES TO PHQ9 QUESTIONS 1 & 2: 0
2. FEELING DOWN, DEPRESSED OR HOPELESS: NOT AT ALL
1. LITTLE INTEREST OR PLEASURE IN DOING THINGS: NOT AT ALL

## 2025-07-10 ASSESSMENT — PAIN SCALES - GENERAL
PAINLEVEL_OUTOF10: 10 - WORST POSSIBLE PAIN
PAINLEVEL_OUTOF10: 10 - WORST POSSIBLE PAIN
PAINLEVEL_OUTOF10: 7
PAIN_LEVEL: 0
PAINLEVEL_OUTOF10: 7
PAINLEVEL_OUTOF10: 0 - NO PAIN
PAINLEVEL_OUTOF10: 6
PAINLEVEL_OUTOF10: 0 - NO PAIN
PAINLEVEL_OUTOF10: 8
PAINLEVEL_OUTOF10: 10 - WORST POSSIBLE PAIN
PAINLEVEL_OUTOF10: 4

## 2025-07-10 ASSESSMENT — LIFESTYLE VARIABLES
HOW OFTEN DO YOU HAVE A DRINK CONTAINING ALCOHOL: NEVER
AUDIT-C TOTAL SCORE: 0
HOW MANY STANDARD DRINKS CONTAINING ALCOHOL DO YOU HAVE ON A TYPICAL DAY: PATIENT DOES NOT DRINK
SKIP TO QUESTIONS 9-10: 1
HOW OFTEN DO YOU HAVE 6 OR MORE DRINKS ON ONE OCCASION: NEVER
AUDIT-C TOTAL SCORE: 0

## 2025-07-10 ASSESSMENT — PAIN DESCRIPTION - LOCATION
LOCATION: ARM

## 2025-07-10 ASSESSMENT — COLUMBIA-SUICIDE SEVERITY RATING SCALE - C-SSRS
1. IN THE PAST MONTH, HAVE YOU WISHED YOU WERE DEAD OR WISHED YOU COULD GO TO SLEEP AND NOT WAKE UP?: NO
2. HAVE YOU ACTUALLY HAD ANY THOUGHTS OF KILLING YOURSELF?: NO
6. HAVE YOU EVER DONE ANYTHING, STARTED TO DO ANYTHING, OR PREPARED TO DO ANYTHING TO END YOUR LIFE?: NO

## 2025-07-10 ASSESSMENT — PAIN DESCRIPTION - DESCRIPTORS
DESCRIPTORS: ACHING
DESCRIPTORS: ACHING;SORE
DESCRIPTORS: SORE;ACHING
DESCRIPTORS: SORE
DESCRIPTORS: ACHING;SORE

## 2025-07-10 ASSESSMENT — PAIN DESCRIPTION - ORIENTATION
ORIENTATION: RIGHT;LEFT
ORIENTATION: OTHER (COMMENT)

## 2025-07-10 NOTE — SIGNIFICANT EVENT
Pt returned to room from PACU, bilateral groin sites soft, right groin has old drainage, left groin is clean, both are dry and intact, pt had used external catheter to suction, see PACU charting for output, pt complains of pain to bilateral arms/shoulders, positioned for comfort, will give toradol when dose is available, EKG completed, family at bedside

## 2025-07-10 NOTE — ANESTHESIA PREPROCEDURE EVALUATION
Patient: Brandon Schmidt    Procedure Information       Date/Time: 07/10/25 0800    Procedure: Ablation A-Fib - re-do  Hold SEMAGLUTIDE for 7 days prior to procedure.    Location: [facility] 5 / Virtual [facility]    Providers: Chacho Harper MD            Relevant Problems   Cardiac   (+) Atrial fibrillation (Multi)   (+) Atrial fibrillation with rapid ventricular response (Multi)   (+) Benign essential HTN      Endocrine   (+) Class 3 drug-induced obesity with body mass index (BMI) of 50.0 to 59.9 in adult       Clinical information reviewed:   Tobacco  Allergies  Meds  Problems  Med Hx  Surg Hx   Fam Hx  Soc   Hx        NPO Detail:  NPO/Void Status  Date of Last Liquid: 07/10/25  Time of Last Liquid: 0520  Date of Last Solid: 07/09/25  Time of Last Solid: 2030  Last Intake Type: Clear fluids  Time of Last Void: 0500         Physical Exam    Airway  Mallampati: II  TM distance: >3 FB  Neck ROM: full  Mouth opening: 3 or more finger widths     Cardiovascular - normal exam  Rhythm: irregular  Rate: abnormal     Dental    Pulmonary - normal exam   Abdominal - normal exam           Anesthesia Plan    History of general anesthesia?: yes  History of complications of general anesthesia?: no    ASA 3     general   (A-Line)  The patient is not a current smoker.  Patient was previously instructed to abstain from smoking on day of procedure.  Patient did not smoke on day of procedure.    intravenous induction   Anesthetic plan and risks discussed with patient.    Plan discussed with CRNA and attending.

## 2025-07-10 NOTE — SIGNIFICANT EVENT
External catheter removed, IV to saline lock, pulse ox 95% on room  air, pt sitting at side of bed, states left arm is much improved but right arm is still stiff, box lunch and oral hydration given, report given to 8s RN,

## 2025-07-10 NOTE — PERIOPERATIVE NURSING NOTE
Patient now able to move both arms with less pain.  
Patient still complaining of his arms hurting and requesting that they be moved by nurse-it helps the pain he states. 10mg oxycodone pills now. Patient agrees that pain is better but still hurt very bad.Allie HANSEN CNP secure texted  with update.  
Since waking up patient is yelling,crying that his arms hurt. Dr Dasilva aware. Whichever arm blood pressure cuff is on and inflating seems to hurts worst. When arms are passively moved by nurse they feel better per patient.  
soft/nondistended/nontender

## 2025-07-10 NOTE — ANESTHESIA POSTPROCEDURE EVALUATION
Patient: Brandon Schmidt    Procedure Summary       Date: 07/10/25 Room / Location: [facility] / [facility]    Anesthesia Start: 0829 Anesthesia Stop:     Procedures:       Ablation A-Fib      Cardioversion Diagnosis:       Atrial fibrillation with rapid ventricular response (Multi)      (Same)    Providers: Chacho Harper MD Responsible Provider: Marilyn Dasilva MD    Anesthesia Type: general ASA Status: 3            Anesthesia Type: general    Vitals Value Taken Time   /53 07/10/25 13:23   Temp 36.5 07/10/25 13:26   Pulse 112 07/10/25 13:24   Resp 30 07/10/25 13:24   SpO2 91 % 07/10/25 13:24   Vitals shown include unfiled device data.    Anesthesia Post Evaluation    Patient location during evaluation: bedside  Patient participation: complete - patient cannot participate  Level of consciousness: lethargic  Pain score: 0  Pain management: adequate  Airway patency: patent  Cardiovascular status: acceptable and stable  Respiratory status: acceptable, face mask and oral airway  Hydration status: stable  Postoperative Nausea and Vomiting: none        There were no known notable events for this encounter.

## 2025-07-10 NOTE — Clinical Note
Sheath was exchanged in the right femoral vein with INTRODUCER, TRANSSEPTAL GUIDE, JADE, SL-1 8.5 X 63 CM. SLO sheath exchanged for a new SLO Sheath.

## 2025-07-10 NOTE — Clinical Note
Sheath was exchanged in the vein with INTRODUCER, TRANSSEPTAL, JADE, 8.5 X 67 CM, W/VALVE, 160CM SS GW 3MM JJanneth

## 2025-07-10 NOTE — ANESTHESIA PROCEDURE NOTES
Airway  Date/Time: 7/10/2025 8:50 AM  Reason: elective    Airway not difficult    Staffing  Performed: attending   Authorized by: Marilyn Dasilva MD    Performed by: BETO Zimmer-AIDEN  Patient location during procedure: OR    Patient Condition  Indications for airway management: anesthesia  Patient position: sniffing  MILS maintained throughout  Planned trial extubation  Sedation level: deep     Final Airway Details   Preoxygenated: yes  Final airway type: endotracheal airway  Successful airway: ETT  Cuffed: yes   Successful intubation technique: video laryngoscopy  Adjuncts used in placement: intubating stylet  Endotracheal tube insertion site: oral  Blade size: #4  ETT size (mm): 7.5  Cormack-Lehane Classification: grade IIb - view of arytenoids or posterior of glottis only  Placement verified by: chest auscultation and capnometry   Cuff volume (mL): 7  Measured from: lips  ETT to lips (cm): 24  Ventilation between attempts: BVM  Number of attempts at approach: 1  Number of other approaches attempted: 0

## 2025-07-11 ENCOUNTER — APPOINTMENT (OUTPATIENT)
Dept: OPHTHALMOLOGY | Facility: CLINIC | Age: 30
End: 2025-07-11
Payer: COMMERCIAL

## 2025-07-11 ENCOUNTER — APPOINTMENT (OUTPATIENT)
Dept: CARDIOLOGY | Facility: HOSPITAL | Age: 30
End: 2025-07-11
Payer: COMMERCIAL

## 2025-07-11 VITALS
SYSTOLIC BLOOD PRESSURE: 137 MMHG | HEIGHT: 74 IN | BODY MASS INDEX: 40.43 KG/M2 | DIASTOLIC BLOOD PRESSURE: 68 MMHG | TEMPERATURE: 99 F | RESPIRATION RATE: 18 BRPM | OXYGEN SATURATION: 98 % | WEIGHT: 315 LBS | HEART RATE: 100 BPM

## 2025-07-11 LAB
ATRIAL RATE: 99 BPM
P AXIS: 75 DEGREES
P OFFSET: 171 MS
P ONSET: 127 MS
PR INTERVAL: 176 MS
Q ONSET: 215 MS
QRS COUNT: 16 BEATS
QRS DURATION: 104 MS
QT INTERVAL: 380 MS
QTC CALCULATION(BAZETT): 487 MS
QTC FREDERICIA: 449 MS
R AXIS: 42 DEGREES
T AXIS: 2 DEGREES
T OFFSET: 405 MS
VENTRICULAR RATE: 99 BPM

## 2025-07-11 PROCEDURE — 2500000004 HC RX 250 GENERAL PHARMACY W/ HCPCS (ALT 636 FOR OP/ED): Performed by: NURSE ANESTHETIST, CERTIFIED REGISTERED

## 2025-07-11 PROCEDURE — 2500000001 HC RX 250 WO HCPCS SELF ADMINISTERED DRUGS (ALT 637 FOR MEDICARE OP): Performed by: NURSE PRACTITIONER

## 2025-07-11 PROCEDURE — 7100000011 HC EXTENDED STAY RECOVERY HOURLY - NURSING UNIT

## 2025-07-11 PROCEDURE — 99239 HOSP IP/OBS DSCHRG MGMT >30: CPT | Performed by: NURSE PRACTITIONER

## 2025-07-11 PROCEDURE — 93010 ELECTROCARDIOGRAM REPORT: CPT | Performed by: INTERNAL MEDICINE

## 2025-07-11 PROCEDURE — 93005 ELECTROCARDIOGRAM TRACING: CPT | Mod: 59

## 2025-07-11 RX ORDER — PANTOPRAZOLE SODIUM 40 MG/1
40 TABLET, DELAYED RELEASE ORAL
Qty: 30 TABLET | Refills: 0 | Status: SHIPPED | OUTPATIENT
Start: 2025-07-11

## 2025-07-11 RX ORDER — PANTOPRAZOLE SODIUM 40 MG/1
40 TABLET, DELAYED RELEASE ORAL 2 TIMES DAILY
Qty: 60 TABLET | Refills: 0 | Status: SHIPPED | OUTPATIENT
Start: 2025-07-11 | End: 2025-07-11

## 2025-07-11 RX ORDER — TRAMADOL HYDROCHLORIDE 50 MG/1
50 TABLET, FILM COATED ORAL EVERY 6 HOURS PRN
Qty: 15 TABLET | Refills: 0 | Status: SHIPPED | OUTPATIENT
Start: 2025-07-11

## 2025-07-11 RX ADMIN — APIXABAN 5 MG: 5 TABLET, FILM COATED ORAL at 09:54

## 2025-07-11 RX ADMIN — PANTOPRAZOLE SODIUM 40 MG: 40 TABLET, DELAYED RELEASE ORAL at 06:43

## 2025-07-11 RX ADMIN — TRAMADOL HYDROCHLORIDE 50 MG: 50 TABLET, FILM COATED ORAL at 06:48

## 2025-07-11 RX ADMIN — LISINOPRIL: 20 TABLET ORAL at 09:53

## 2025-07-11 RX ADMIN — METOPROLOL TARTRATE 50 MG: 50 TABLET, FILM COATED ORAL at 09:54

## 2025-07-11 ASSESSMENT — COGNITIVE AND FUNCTIONAL STATUS - GENERAL
MOBILITY SCORE: 24
DAILY ACTIVITIY SCORE: 24

## 2025-07-11 ASSESSMENT — PAIN SCALES - GENERAL
PAINLEVEL_OUTOF10: 6
PAINLEVEL_OUTOF10: 2

## 2025-07-11 ASSESSMENT — PAIN - FUNCTIONAL ASSESSMENT
PAIN_FUNCTIONAL_ASSESSMENT: 0-10
PAIN_FUNCTIONAL_ASSESSMENT: 0-10

## 2025-07-11 NOTE — CARE PLAN
The clinical goals for the shift include Patient's bilaterral groin sites will remain stable through shift      Problem: Pain - Adult  Goal: Verbalizes/displays adequate comfort level or baseline comfort level  Outcome: Progressing     Problem: Safety - Adult  Goal: Free from fall injury  Outcome: Progressing     Problem: Discharge Planning  Goal: Discharge to home or other facility with appropriate resources  Outcome: Progressing     Problem: Chronic Conditions and Co-morbidities  Goal: Patient's chronic conditions and co-morbidity symptoms are monitored and maintained or improved  Outcome: Progressing     Problem: Nutrition  Goal: Nutrient intake appropriate for maintaining nutritional needs  Outcome: Progressing

## 2025-07-11 NOTE — DISCHARGE SUMMARY
Discharge Diagnosis  Atrial fibrillation with rapid ventricular response (Multi)    Hospital Course  Brandon Schmidt is a 30-year-old male who was admitted electively to [facility] on 7/10/2025 for atrial fibrillation ablation under the care of Dr. Harper.  Patient underwent successful atrial fibrillation ablation on 7/10/2025.  He tolerated the procedure well.  Please see procedural report for complete details.  Patient developed sinus tachycardia shortly after his procedure.  He was given metoprolol tartrate 50 mg and was back to SR/ST  later in the evening.  He was asymptomatic.  Patient was monitored overnight on telemetry on the stepdown unit.  He had an uneventful evening.  This morning he is sitting up in the chair at bedside and denies complaints of lightheadedness, dizziness, chest pain, shortness of breath or palpitations.  He has been up ambulating without difficulty.  He does complain of bilateral shoulder pain.  He was instructed to follow-up with his PCP to further evaluate.  He denies complaints of bilateral groin procedural access site discomfort.  Bilateral groin dressings were removed and both areas are soft, intact with no drainage, bleeding/hematoma or bruit.  Telemetry reveals SR 80 to 90s.  ECG this morning reveals SR with occasional PACs at a rate of 99 and a QTc of 487.  Patient will be discharged home today and is scheduled to follow-up per routine PVI protocol.  Postprocedural activity, restrictions, potential complications, medications and future follow-up discussed at length.  A return to work slip was provided to patient.  All questions answered.  Patient verbalized an understanding.    Visit Vitals  /68 (BP Location: Left arm, Patient Position: Sitting)   Pulse 100   Temp 37.2 °C (99 °F) (Temporal)   Resp 18     Vitals:    07/10/25 0632   Weight: (!) 191 kg (422 lb 2.9 oz)     Pertinent Physical Exam At Time of Discharge  Physical Exam  Vitals reviewed.    Constitutional:       Appearance: Normal appearance. He is obese.   HENT:      Head: Normocephalic and atraumatic.      Nose: Nose normal.      Mouth/Throat:      Mouth: Mucous membranes are moist.      Pharynx: Oropharynx is clear.   Eyes:      Pupils: Pupils are equal, round, and reactive to light.   Cardiovascular:      Rate and Rhythm: Regular rhythm. Tachycardia present.      Pulses: Normal pulses.      Heart sounds: Normal heart sounds.   Pulmonary:      Effort: Pulmonary effort is normal.      Breath sounds: Normal breath sounds.   Abdominal:      General: Bowel sounds are normal.      Palpations: Abdomen is soft.   Musculoskeletal:         General: Normal range of motion.      Cervical back: Normal range of motion and neck supple.      Right lower leg: Edema present.      Left lower leg: Edema present.   Skin:     General: Skin is warm and dry.      Comments: Bilateral groin procedural access sites are intact, soft with no bleeding/hematoma or bruit.   Neurological:      General: No focal deficit present.      Mental Status: He is alert and oriented to person, place, and time.   Psychiatric:         Mood and Affect: Mood normal.         Behavior: Behavior normal.       Home Medications     Medication List      START taking these medications     pantoprazole 40 mg EC tablet; Commonly known as: ProtoNix; Take 1 tablet   (40 mg) by mouth once daily in the morning. Take before meals. Do not   crush, chew, or split.   traMADol 50 mg tablet; Commonly known as: Ultram; Take 1 tablet (50 mg)   by mouth every 6 hours if needed for moderate pain (4 - 6). If Tylenol not   effective     CONTINUE taking these medications     apixaban 5 mg tablet; Commonly known as: Eliquis; Take 1 tablet (5 mg)   by mouth 2 times a day.   lisinopriL-hydrochlorothiazide 20-25 mg tablet; Take 1 tablet by mouth   once daily.   metoprolol succinate  mg 24 hr tablet; Commonly known as:   Toprol-XL; Take 1 tablet (100 mg) by mouth once  daily. Do not crush or   chew.   semaglutide 0.25 mg or 0.5 mg (2 mg/3 mL) pen injector; Inject 0.25 mg   under the skin 1 (one) time per week.       Outpatient Follow-Up  Future Appointments   Date Time Provider Department Center   7/16/2025  9:00 AM [facility] [id number] ECG/[facility] [id number] [facility]   8/7/2025 10:30 AM Madison Candelario PA-C [id number] [facility]   8/15/2025  9:00 AM [facility] [id number] ECG/[facility] [id number] [facility]   9/8/2025  9:30 AM Ganesh Young MD [id number] [facility]   9/15/2025  9:30 AM [facility] [id number] ECG/[facility]TER [id number] [facility]   10/22/2025  1:15 PM Chacho Harper MD [id number] [facility]     >35 minutes  Allie Caceres, APRN-CNP

## 2025-07-11 NOTE — CARE PLAN
Problem: Pain - Adult  Goal: Verbalizes/displays adequate comfort level or baseline comfort level  Outcome: Progressing     Problem: Safety - Adult  Goal: Free from fall injury  Outcome: Progressing     Problem: Discharge Planning  Goal: Discharge to home or other facility with appropriate resources  Outcome: Progressing     Problem: Chronic Conditions and Co-morbidities  Goal: Patient's chronic conditions and co-morbidity symptoms are monitored and maintained or improved  Outcome: Progressing     Problem: Nutrition  Goal: Nutrient intake appropriate for maintaining nutritional needs  Outcome: Progressing   The patient's goals for the shift include      The clinical goals for the shift include Patient's bilaterral groin sites will remain stable through shift

## 2025-07-13 LAB
EJECTION FRACTION: 43 %
RIGHT VENTRICLE PEAK SYSTOLIC PRESSURE: 27 MMHG

## 2025-07-14 LAB
ATRIAL RATE: 102 BPM
ATRIAL RATE: 99 BPM
P AXIS: 73 DEGREES
P AXIS: 75 DEGREES
P OFFSET: 171 MS
P OFFSET: 179 MS
P ONSET: 127 MS
P ONSET: 130 MS
PR INTERVAL: 172 MS
PR INTERVAL: 176 MS
Q ONSET: 215 MS
Q ONSET: 216 MS
Q ONSET: 216 MS
Q ONSET: 221 MS
QRS COUNT: 16 BEATS
QRS COUNT: 17 BEATS
QRS COUNT: 29 BEATS
QRS COUNT: 29 BEATS
QRS DURATION: 104 MS
QRS DURATION: 106 MS
QRS DURATION: 84 MS
QRS DURATION: 90 MS
QT INTERVAL: 260 MS
QT INTERVAL: 276 MS
QT INTERVAL: 374 MS
QT INTERVAL: 380 MS
QTC CALCULATION(BAZETT): 448 MS
QTC CALCULATION(BAZETT): 472 MS
QTC CALCULATION(BAZETT): 487 MS
QTC CALCULATION(BAZETT): 487 MS
QTC FREDERICIA: 374 MS
QTC FREDERICIA: 395 MS
QTC FREDERICIA: 446 MS
QTC FREDERICIA: 449 MS
R AXIS: 40 DEGREES
R AXIS: 40 DEGREES
R AXIS: 42 DEGREES
R AXIS: 61 DEGREES
T AXIS: -13 DEGREES
T AXIS: -3 DEGREES
T AXIS: 2 DEGREES
T AXIS: 3 DEGREES
T OFFSET: 351 MS
T OFFSET: 354 MS
T OFFSET: 403 MS
T OFFSET: 405 MS
VENTRICULAR RATE: 102 BPM
VENTRICULAR RATE: 176 BPM
VENTRICULAR RATE: 179 BPM
VENTRICULAR RATE: 99 BPM

## 2025-07-15 ENCOUNTER — PATIENT MESSAGE (OUTPATIENT)
Dept: CARDIOLOGY | Facility: CLINIC | Age: 30
End: 2025-07-15
Payer: COMMERCIAL

## 2025-07-16 ENCOUNTER — APPOINTMENT (OUTPATIENT)
Dept: CARDIOLOGY | Facility: CLINIC | Age: 30
End: 2025-07-16
Payer: COMMERCIAL

## 2025-07-16 DIAGNOSIS — I48.91 ATRIAL FIBRILLATION WITH RAPID VENTRICULAR RESPONSE (MULTI): ICD-10-CM

## 2025-07-16 DIAGNOSIS — Z98.890 H/O CARDIAC RADIOFREQUENCY ABLATION: ICD-10-CM

## 2025-07-25 ENCOUNTER — TELEMEDICINE (OUTPATIENT)
Dept: PRIMARY CARE | Facility: CLINIC | Age: 30
End: 2025-07-25
Payer: COMMERCIAL

## 2025-07-25 DIAGNOSIS — L65.9 HAIR LOSS: Primary | ICD-10-CM

## 2025-07-25 PROCEDURE — 99213 OFFICE O/P EST LOW 20 MIN: CPT | Performed by: FAMILY MEDICINE

## 2025-07-25 NOTE — PROGRESS NOTES
I performed this visit using real-time telehealth tools, including an audio/video OR telephone connection between the patient listed who was located in the [facility] and myself, Marge Amanda (Board certified in the [facility]).At the start of the visit, I introduced myself as Dr. Scott and verified the patients name, , and current physical location.  If they were currently outside of the state of [place], the visit was ended and the patient was referred to alternative means for evaluation and treatment.  The patient was made aware of the limitations of the telehealth visit.  They will not be physically examined and all issues may not be appropriate for a telehealth visit.  If necessary, an in-    In preparing for this visit and writing this note, I reviewed previous electronic medical records (labs, imaging and medical charts) of the patient available in the physician portal. Significant findings which helped in decision making are recorded in this encounter charting.  All allergies were reviewed with the patient and all medications reconciled verbally with the patient at the beginning oft the visit.    Chief complaint:     Scalp issue  After surgery came out of recovery and noted painful spot on scalp-- felt like bad acne  Came home and a few days later it was oozing and pus --felt better and went away--   Now notes bald spot-- can easily pull the hair out around it  A little softer over the bald spot  Had oozing from 7/10-~7/15  Never had before  No family history of this kind of hair loss  No history of seborrheic dermatitis or psoriasis    No other spots on scalp          Used to have bad acne all over head and face      All other ROS (-)    General appearance:  Vitals available from patient? no  Alert, oriented, pleasant, in no apparent distress? yes  Answers questions appropriately? yes  Eyes clear? yes  Is patient in respiratory distress? no  Throat exam: not available  Is patient coughing during  consult: no  Audible wheezing noted? : no  Pt sounds congested?:  no  Sniffing or rhinorrhea?:  no  Frontal sinus TTP by palpation?  no  Maxillary sinus TTP by palpation? no  Tender neck LAD by pt exam?: no  Psychiatric: Affect normal? yes  Other relevant physical exam:                Assessment and Plan:          Discussed with patient during visit  differential diagnosis; viral versus bacterial infection;  (if relevant); use of medications prescribed and possible side effects; appropriate over-the-counter medications; possible complications ; when to follow-up for in person evaluation.  All patient's questions were answered. Patient has good decision making capacity.  They are alert to person, place, time and situation.  Patient has the ability to communicate choice, understand information, consequences, and reason rationally.  If sent for in person care at  or ED,    I explained why Urgent in person exam was necessary and that failure to have further evaluation, and treatment today may lead to, negative outcomes, permanent disability, or even death.   If not sent for in person care today,   follow-up with your PCP in 2-3 days, sooner if not  improving.    Follow-up immediately if symptoms worsen.   If experiencing symptoms including but not limited to lethargy / chest pain / weakness / dizziness / difficulty breathing please call 911 or go to the closest emergency department for immediate care.   Limitations to telemedicine include inability to do a complete and accurate physical exam.    Any concerns regarding this were conveyed with the patient and in person follow-up recommended if the nature of their concern/illness does not progress as anticipated during this visit.

## 2025-07-27 NOTE — PROGRESS NOTES
Clinical Pharmacy Appointment    Patient ID: Brandon Schmidt is a 30 y.o. male who presents for Weight Loss.    Pt is here for Follow Up appointment.     Referring Provider: Ganesh Young MD  PCP: Ganesh Young MD  Last visit with PCP: 4/7/2025   Next visit with PCP: 9/8/2025    Subjective     Drug Interactions  No relevant drug interactions were noted.    Medication System Management  Patient's preferred pharmacy: [facility] for semaglutide   Adherence/Organization: reports being off semaglutide for 2 weeks prior to surgery  Affordability/Accessibility: no concerns       Interval History  5/28/25 with Carlito Stiles, PharmD: Increase compounded semaglutide 0.3 mg to 0.6 mg once weekly    HPI  Weight Loss  Starting Weight: 401 lb  Starting BMI: 51.5 kg/m2    Current Weight: 422 lb  Current BMI: 54.20 kg/m2    Comorbid Conditions:  HTN: Yes  Cholesterol: No  SHAILA: Yes  The ASCVD Risk score (Cee DK, et al., 2019) failed to calculate for the following reasons:    The 2019 ASCVD risk score is only valid for ages 40 to 79    Pertinent PMH Review:  - PMH of Pancreatitis: No  - PMH/FH of Medullary Thyroid Cancer: No  - PMH of Retinopathy: No      Objective   Allergies[1]  Social History     Social History Narrative    Not on file      Medication Review  Current Outpatient Medications   Medication Instructions    apixaban (ELIQUIS) 5 mg, oral, 2 times daily    lisinopriL-hydrochlorothiazide 20-25 mg tablet 1 tablet, oral, Daily    metoprolol succinate XL (TOPROL-XL) 100 mg, oral, Daily, Do not crush or chew.    pantoprazole (PROTONIX) 40 mg, oral, Daily before breakfast, Do not crush, chew, or split.    semaglutide 0.25 mg, subcutaneous, Weekly    traMADol (ULTRAM) 50 mg, oral, Every 6 hours PRN, If Tylenol not effective      Vitals  BP Readings from Last 2 Encounters:   07/11/25 137/68   07/09/25 (!) 140/108     BMI Readings from Last 1 Encounters:   07/10/25 54.20 kg/m²      Labs  A1C  Lab Results  "  Component Value Date    HGBA1C 6.2 (H) 04/04/2025     BMP  Lab Results   Component Value Date    CALCIUM 9.0 07/09/2025     (L) 07/09/2025    K 4.0 07/09/2025    CO2 25 07/09/2025     07/09/2025    BUN 22 07/09/2025    CREATININE 0.91 07/09/2025    EGFR >90 07/09/2025     LFTs  Lab Results   Component Value Date    ALT 51 07/09/2025    AST 22 07/09/2025    ALKPHOS 63 07/09/2025    BILITOT 0.6 07/09/2025     FLP  No results found for: \"TRIG\", \"CHOL\", \"LDLF\", \"LDLCALC\", \"HDL\"  Urine Microalbumin  No results found for: \"MICROALBCREA\"  Weight Management  Wt Readings from Last 3 Encounters:   07/10/25 (!) 191 kg (422 lb 2.9 oz)   07/09/25 (!) 191 kg (421 lb 1.3 oz)   05/21/25 (!) 181 kg (400 lb)      There is no height or weight on file to calculate BMI.     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 He was off the medication for two weeks following surgery but has since resumed treatment. Since May, he has gained 22 pounds, which he attributes to being off the medication and unable to exercise during his recovery. He states he plans to return to the gym starting tomorrow. He currently has two vials of the 0.6 mg dose remaining and plans to increase the dose once these are finished.    Plan:  INCREASE to semaglutide 1.2 mg weekly         Relevant Medications    semaglutide 0.25 mg or 0.5 mg (2 mg/3 mL) pen injector    Other Relevant Orders    Referral to Clinical Pharmacy     Clinical Pharmacist follow-up: 9/2 @ 1:20, Telehealth visit    Continue all meds under the continuation of care with the referring provider and clinical pharmacy team.    Travon Rose PharmD  Clinical Pharmacy Specialist      Verbal consent to manage patient's drug therapy was obtained from the patient. They were informed they may decline to participate or withdraw from " participation in pharmacy services at any time.         [1] No Known Allergies

## 2025-07-28 ENCOUNTER — APPOINTMENT (OUTPATIENT)
Dept: PHARMACY | Facility: HOSPITAL | Age: 30
End: 2025-07-28
Payer: COMMERCIAL

## 2025-07-28 DIAGNOSIS — E66.1 CLASS 3 DRUG-INDUCED OBESITY WITH SERIOUS COMORBIDITY AND BODY MASS INDEX (BMI) OF 50.0 TO 59.9 IN ADULT: ICD-10-CM

## 2025-07-28 DIAGNOSIS — E66.813 CLASS 3 DRUG-INDUCED OBESITY WITH SERIOUS COMORBIDITY AND BODY MASS INDEX (BMI) OF 50.0 TO 59.9 IN ADULT: ICD-10-CM

## 2025-07-28 NOTE — ASSESSMENT & PLAN NOTE
Patient reports doing well on compounded semaglutide. He was off the medication for two weeks following surgery but has since resumed treatment. Since May, he has gained 22 pounds, which he attributes to being off the medication and unable to exercise during his recovery. He states he plans to return to the gym starting tomorrow. He currently has two vials of the 0.6 mg dose remaining and plans to increase the dose once these are finished.    Plan:  INCREASE to semaglutide 1.2 mg weekly

## 2025-07-29 NOTE — PATIENT INSTRUCTIONS
"Bald spot on scalp after red, oozing areas healed--  Discussed folliculitis -- and possible folliculitis decalvans--   Recommend derm follow up for further evaluation and possible biopsy--referral placed to dermatology    Please send me a MyChart message if you have any questions or concerns.  FOR NON URGENT questions only.  Allow up to 72 hours for response.    If you have prescription issues or other questions you can email   Liz Urias  Digital Health Coordinator, at   [email or web address]     Rest and drink plenty of fluids    Tylenol and or motrin as needed for pain and fever (unless you have been told not to take these because of your personal medical history)    Discussed options and precautions (complaint specific and may include)  Viral versus bacterial infection; use of medications; possible side effects; appropriate over-the-counter medications; possible complications and /or when to follow-up.    if sent for in person care at UC or ED,  it was explained why and failure to have \"higher level of care\" further evaluation, and treatment today may lead, but is not limited to negative outcomes, permanent disability, or even death.     All red flags requiring in person care were discussed.    If not sent for in person care today, follow-up with your PCP in 2-3 days, sooner if not  improving.    Follow-up immediately if symptoms worsen. If experiencing symptoms including but not limited to lethargy / chest pain / weakness / dizziness / difficulty breathing please call 911 or go to the emergency department for immediate care.     All patient's questions were answered. Patient has good decision making capacity.  They are alert to person, place, time and situation.  Patient has the ability to communicate choice, understand information, consequences, and reason rationally.      ____________________________________________________________________________________________________________  To connect " with a new PCP please visit [email or web address] or call [phone]

## 2025-08-07 ENCOUNTER — APPOINTMENT (OUTPATIENT)
Dept: CARDIOLOGY | Facility: CLINIC | Age: 30
End: 2025-08-07
Payer: COMMERCIAL

## 2025-08-15 ENCOUNTER — APPOINTMENT (OUTPATIENT)
Dept: CARDIOLOGY | Facility: CLINIC | Age: 30
End: 2025-08-15
Payer: COMMERCIAL

## 2025-08-20 PROCEDURE — 93272 ECG/REVIEW INTERPRET ONLY: CPT | Performed by: INTERNAL MEDICINE

## 2025-08-20 ASSESSMENT — DERMATOLOGY QUALITY OF LIFE (QOL) ASSESSMENT
RATE HOW EMOTIONALLY BOTHERED YOU ARE BY YOUR SKIN PROBLEM (FOR EXAMPLE, WORRY, EMBARRASSMENT, FRUSTRATION): 4
WHAT SINGLE SKIN CONDITION LISTED BELOW IS THE PATIENT ANSWERING THE QUALITY-OF-LIFE ASSESSMENT QUESTIONS ABOUT: NONE OF THE ABOVE
RATE HOW BOTHERED YOU ARE BY SYMPTOMS OF YOUR SKIN PROBLEM (EG, ITCHING, STINGING BURNING, HURTING OR SKIN IRRITATION): 1
RATE HOW EMOTIONALLY BOTHERED YOU ARE BY YOUR SKIN PROBLEM (FOR EXAMPLE, WORRY, EMBARRASSMENT, FRUSTRATION): 4
WHAT SINGLE SKIN CONDITION LISTED BELOW IS THE PATIENT ANSWERING THE QUALITY-OF-LIFE ASSESSMENT QUESTIONS ABOUT: NONE OF THE ABOVE
DATE THE QUALITY-OF-LIFE ASSESSMENT WAS COMPLETED: 67396
RATE HOW BOTHERED YOU ARE BY EFFECTS OF YOUR SKIN PROBLEMS ON YOUR ACTIVITIES (EG, GOING OUT, ACCOMPLISHING WHAT YOU WANT, WORK ACTIVITIES OR YOUR RELATIONSHIPS WITH OTHERS): 2
RATE HOW BOTHERED YOU ARE BY EFFECTS OF YOUR SKIN PROBLEMS ON YOUR ACTIVITIES (EG, GOING OUT, ACCOMPLISHING WHAT YOU WANT, WORK ACTIVITIES OR YOUR RELATIONSHIPS WITH OTHERS): 2
RATE HOW BOTHERED YOU ARE BY SYMPTOMS OF YOUR SKIN PROBLEM (EG, ITCHING, STINGING BURNING, HURTING OR SKIN IRRITATION): 1

## 2025-08-20 ASSESSMENT — PATIENT GLOBAL ASSESSMENT (PGA): WHAT IS THE PGA: PATIENT GLOBAL ASSESSMENT:  1 - CLEAR

## 2025-08-22 ENCOUNTER — OFFICE VISIT (OUTPATIENT)
Dept: PRIMARY CARE | Facility: CLINIC | Age: 30
End: 2025-08-22
Payer: COMMERCIAL

## 2025-08-22 VITALS
WEIGHT: 315 LBS | HEIGHT: 74 IN | HEART RATE: 104 BPM | DIASTOLIC BLOOD PRESSURE: 66 MMHG | SYSTOLIC BLOOD PRESSURE: 130 MMHG | BODY MASS INDEX: 40.43 KG/M2

## 2025-08-22 DIAGNOSIS — Z91.09 ENVIRONMENTAL ALLERGIES: Primary | ICD-10-CM

## 2025-08-22 PROCEDURE — 3008F BODY MASS INDEX DOCD: CPT | Performed by: FAMILY MEDICINE

## 2025-08-22 PROCEDURE — 99213 OFFICE O/P EST LOW 20 MIN: CPT | Performed by: FAMILY MEDICINE

## 2025-08-22 PROCEDURE — 3075F SYST BP GE 130 - 139MM HG: CPT | Performed by: FAMILY MEDICINE

## 2025-08-22 PROCEDURE — 3078F DIAST BP <80 MM HG: CPT | Performed by: FAMILY MEDICINE

## 2025-08-22 PROCEDURE — 1036F TOBACCO NON-USER: CPT | Performed by: FAMILY MEDICINE

## 2025-08-22 RX ORDER — ALBUTEROL SULFATE 90 UG/1
2 INHALANT RESPIRATORY (INHALATION) EVERY 4 HOURS PRN
Qty: 8.5 G | Refills: 5 | Status: SHIPPED | OUTPATIENT
Start: 2025-08-22 | End: 2026-08-22

## 2025-08-22 RX ORDER — FLUTICASONE PROPIONATE 50 MCG
1 SPRAY, SUSPENSION (ML) NASAL DAILY
Qty: 16 G | Refills: 11 | Status: SHIPPED | OUTPATIENT
Start: 2025-08-22 | End: 2026-08-22

## 2025-08-22 ASSESSMENT — ENCOUNTER SYMPTOMS
HEADACHES: 0
SHORTNESS OF BREATH: 0
CHEST TIGHTNESS: 0
FATIGUE: 0
DIZZINESS: 0
CHILLS: 0

## 2025-08-22 ASSESSMENT — PATIENT HEALTH QUESTIONNAIRE - PHQ9
1. LITTLE INTEREST OR PLEASURE IN DOING THINGS: NOT AT ALL
SUM OF ALL RESPONSES TO PHQ9 QUESTIONS 1 AND 2: 0
2. FEELING DOWN, DEPRESSED OR HOPELESS: NOT AT ALL

## 2025-08-25 ENCOUNTER — APPOINTMENT (OUTPATIENT)
Dept: PHARMACY | Facility: HOSPITAL | Age: 30
End: 2025-08-25
Payer: COMMERCIAL

## 2025-08-25 ENCOUNTER — PATIENT MESSAGE (OUTPATIENT)
Dept: PRIMARY CARE | Facility: CLINIC | Age: 30
End: 2025-08-25

## 2025-08-25 DIAGNOSIS — E66.813 CLASS 3 DRUG-INDUCED OBESITY WITH SERIOUS COMORBIDITY AND BODY MASS INDEX (BMI) OF 50.0 TO 59.9 IN ADULT: ICD-10-CM

## 2025-08-25 DIAGNOSIS — G47.30 SLEEP APNEA, UNSPECIFIED TYPE: Primary | ICD-10-CM

## 2025-08-25 DIAGNOSIS — I10 BENIGN ESSENTIAL HTN: Primary | ICD-10-CM

## 2025-08-25 DIAGNOSIS — E66.1 CLASS 3 DRUG-INDUCED OBESITY WITH SERIOUS COMORBIDITY AND BODY MASS INDEX (BMI) OF 50.0 TO 59.9 IN ADULT: ICD-10-CM

## 2025-08-25 RX ORDER — SEMAGLUTIDE 0.25 MG/.5ML
0.25 INJECTION, SOLUTION SUBCUTANEOUS WEEKLY
Qty: 2 ML | Refills: 0 | Status: SHIPPED | OUTPATIENT
Start: 2025-08-25 | End: 2025-09-16

## 2025-08-27 ENCOUNTER — APPOINTMENT (OUTPATIENT)
Dept: DERMATOLOGY | Facility: CLINIC | Age: 30
End: 2025-08-27
Payer: COMMERCIAL

## 2025-08-27 DIAGNOSIS — B35.0 TINEA CAPITIS: Primary | ICD-10-CM

## 2025-08-27 PROCEDURE — 99204 OFFICE O/P NEW MOD 45 MIN: CPT | Performed by: DERMATOLOGY

## 2025-08-27 RX ORDER — FLUCONAZOLE 150 MG/1
150 TABLET ORAL
Qty: 6 TABLET | Refills: 0 | Status: SHIPPED | OUTPATIENT
Start: 2025-08-27 | End: 2025-10-02

## 2025-09-02 ENCOUNTER — APPOINTMENT (OUTPATIENT)
Dept: PHARMACY | Facility: HOSPITAL | Age: 30
End: 2025-09-02
Payer: COMMERCIAL

## 2025-09-02 ASSESSMENT — ENCOUNTER SYMPTOMS
FEVER: 0
RHINORRHEA: 0
CHILLS: 0
SHORTNESS OF BREATH: 1
HEMOPTYSIS: 0
MYALGIAS: 0
COUGH: 1
WHEEZING: 1
HEADACHES: 1
WEIGHT LOSS: 0
SORE THROAT: 0
HEARTBURN: 0
SWEATS: 0

## 2025-09-03 ENCOUNTER — OFFICE VISIT (OUTPATIENT)
Dept: PRIMARY CARE | Facility: CLINIC | Age: 30
End: 2025-09-03
Payer: COMMERCIAL

## 2025-09-03 VITALS
SYSTOLIC BLOOD PRESSURE: 124 MMHG | HEIGHT: 74 IN | HEART RATE: 113 BPM | WEIGHT: 315 LBS | DIASTOLIC BLOOD PRESSURE: 66 MMHG | BODY MASS INDEX: 40.43 KG/M2

## 2025-09-03 DIAGNOSIS — R05.3 CHRONIC COUGH: Primary | ICD-10-CM

## 2025-09-03 RX ORDER — ALBUTEROL SULFATE AND BUDESONIDE 90; 80 UG/1; UG/1
2 AEROSOL, METERED RESPIRATORY (INHALATION) EVERY 6 HOURS PRN
Qty: 32.1 G | Refills: 0 | Status: SHIPPED | OUTPATIENT
Start: 2025-09-03

## 2025-09-03 RX ORDER — ALBUTEROL SULFATE 0.83 MG/ML
3 SOLUTION RESPIRATORY (INHALATION) ONCE
OUTPATIENT
Start: 2025-09-03 | End: 2025-09-03

## 2025-09-03 RX ORDER — ALBUTEROL SULFATE 90 UG/1
1 INHALANT RESPIRATORY (INHALATION) ONCE
OUTPATIENT
Start: 2025-09-03

## 2025-09-03 ASSESSMENT — PATIENT HEALTH QUESTIONNAIRE - PHQ9
1. LITTLE INTEREST OR PLEASURE IN DOING THINGS: NOT AT ALL
2. FEELING DOWN, DEPRESSED OR HOPELESS: NOT AT ALL
SUM OF ALL RESPONSES TO PHQ9 QUESTIONS 1 AND 2: 0

## 2025-09-03 ASSESSMENT — ENCOUNTER SYMPTOMS
HEMOPTYSIS: 0
SHORTNESS OF BREATH: 1
SWEATS: 0
CHILLS: 0
COUGH: 1
HEARTBURN: 0
MYALGIAS: 0
SORE THROAT: 0
FEVER: 0
RHINORRHEA: 0
WEIGHT LOSS: 0
HEADACHES: 1
WHEEZING: 1

## 2025-09-08 ENCOUNTER — APPOINTMENT (OUTPATIENT)
Dept: PRIMARY CARE | Facility: CLINIC | Age: 30
End: 2025-09-08
Payer: COMMERCIAL

## 2025-09-15 ENCOUNTER — APPOINTMENT (OUTPATIENT)
Dept: CARDIOLOGY | Facility: CLINIC | Age: 30
End: 2025-09-15
Payer: COMMERCIAL

## 2025-09-25 ENCOUNTER — APPOINTMENT (OUTPATIENT)
Dept: PHARMACY | Facility: HOSPITAL | Age: 30
End: 2025-09-25
Payer: COMMERCIAL

## 2025-10-22 ENCOUNTER — APPOINTMENT (OUTPATIENT)
Dept: CARDIOLOGY | Facility: CLINIC | Age: 30
End: 2025-10-22
Payer: COMMERCIAL

## 2025-10-24 ENCOUNTER — APPOINTMENT (OUTPATIENT)
Dept: PRIMARY CARE | Facility: CLINIC | Age: 30
End: 2025-10-24
Payer: COMMERCIAL

## 2025-10-27 ENCOUNTER — APPOINTMENT (OUTPATIENT)
Dept: DERMATOLOGY | Facility: CLINIC | Age: 30
End: 2025-10-27
Payer: COMMERCIAL

## 2026-01-28 ENCOUNTER — APPOINTMENT (OUTPATIENT)
Dept: ALLERGY | Facility: CLINIC | Age: 31
End: 2026-01-28
Payer: COMMERCIAL

## (undated) DEVICE — PATCHES, EXTERNAL REFERENCE, CARTO3

## (undated) DEVICE — TUBING SET, IRRIGATION, SMARTABLATE

## (undated) DEVICE — NEEDLE, SAFESEPT, BLUNT, 71CM 1 CURVE

## (undated) DEVICE — CATHETER, ULTRASOUND, SOUNDSTAR, 8F X 90CM

## (undated) DEVICE — CLOSURE SYSTEM, VASCULAR, MVP 6-12FR, VENOUS

## (undated) DEVICE — INTRODUCER, TRANSSEPTAL, SWARTZ, 8.5 X 67 CM, W/VALVE, 160CM SS GW 3MM J

## (undated) DEVICE — CATHETER, THERMOCOOL SMART TOUCH, SF, F-J CURVE

## (undated) DEVICE — CATHETER, VARIABLE, LASSO, 2-6-2-MM, 20 POLE

## (undated) DEVICE — SHIELD, PERIPHERAL, RADPAD, 11 X 34IN, W/ ABSORBENT, ORANGE, STERILE

## (undated) DEVICE — CABLE, 34 HYP, 34 LEMO, 10FT, SMART TOUCH

## (undated) DEVICE — POSITIONER, RETENTION SYSTEM, PANNUS, 2 PADS 1 STRAP, NS

## (undated) DEVICE — INTRODUCER, SHEATH, FAST-CATH, 8.5FR X 12CM, C-LOCK

## (undated) DEVICE — INTRODUCER, SHEATH, FAST-CATH, 8FR X 12CM, C-LOCK

## (undated) DEVICE — GUIDEWIRE, AMPLATZ, TFE, EXTRA STIFF, CURVED, .032/145CM/3MMTIP

## (undated) DEVICE — Device

## (undated) DEVICE — CATHETER, EPL, 7FR DUO, 2/8 2M 95CM, STEERABLE LGCRL

## (undated) DEVICE — GUIDEWIRE KIT, SAFESEPT TRANSSEPTAL, .014 X 135CM

## (undated) DEVICE — 0.035IN DIA X 180CM GUIDERIGHT GUIDEWIRE, J TIP-FIXED CORE, 3MM J RADIUS, 7CM FLEXIBLE TIP, STANDARD